# Patient Record
Sex: FEMALE | Race: WHITE | NOT HISPANIC OR LATINO | Employment: OTHER | URBAN - NONMETROPOLITAN AREA
[De-identification: names, ages, dates, MRNs, and addresses within clinical notes are randomized per-mention and may not be internally consistent; named-entity substitution may affect disease eponyms.]

---

## 2017-07-27 ENCOUNTER — AMBULATORY - GICH (OUTPATIENT)
Dept: RADIOLOGY | Facility: OTHER | Age: 67
End: 2017-07-27

## 2017-07-27 ENCOUNTER — HOSPITAL ENCOUNTER (OUTPATIENT)
Dept: RADIOLOGY | Facility: OTHER | Age: 67
End: 2017-07-27

## 2017-07-27 ENCOUNTER — AMBULATORY - GICH (OUTPATIENT)
Dept: LAB | Facility: OTHER | Age: 67
End: 2017-07-27

## 2017-07-27 DIAGNOSIS — G44.89 OTHER HEADACHE SYNDROME: ICD-10-CM

## 2017-07-27 DIAGNOSIS — I10 ESSENTIAL (PRIMARY) HYPERTENSION: ICD-10-CM

## 2017-07-27 DIAGNOSIS — R42 DIZZINESS AND GIDDINESS: ICD-10-CM

## 2017-07-27 DIAGNOSIS — H53.452 OTHER LOCALIZED VISUAL FIELD DEFECT, LEFT EYE: ICD-10-CM

## 2017-07-27 LAB
CREAT SERPL-MCNC: 1.13 MG/DL (ref 0.7–1.3)
GFR IF NOT AFRICAN AMERICAN - HISTORICAL: 48 ML/MIN/1.73M2

## 2017-07-28 ENCOUNTER — AMBULATORY - GICH (OUTPATIENT)
Dept: RADIOLOGY | Facility: OTHER | Age: 67
End: 2017-07-28

## 2017-07-28 ENCOUNTER — HOSPITAL ENCOUNTER (OUTPATIENT)
Dept: RADIOLOGY | Facility: OTHER | Age: 67
End: 2017-07-28

## 2017-07-28 DIAGNOSIS — I63.431 CEREBRAL INFARCTION DUE TO EMBOLISM OF RIGHT POSTERIOR CEREBRAL ARTERY (H): ICD-10-CM

## 2017-08-10 ENCOUNTER — AMBULATORY - GICH (OUTPATIENT)
Dept: PHYSICAL THERAPY | Facility: OTHER | Age: 67
End: 2017-08-10

## 2017-08-10 DIAGNOSIS — I63.9 CEREBRAL INFARCTION (H): ICD-10-CM

## 2017-08-10 DIAGNOSIS — H53.40 VISUAL FIELD DEFECTS: ICD-10-CM

## 2017-08-16 ENCOUNTER — HOSPITAL ENCOUNTER (OUTPATIENT)
Dept: PHYSICAL THERAPY | Facility: OTHER | Age: 67
Setting detail: THERAPIES SERIES
End: 2017-08-16

## 2017-08-16 DIAGNOSIS — I63.9 CEREBRAL INFARCTION (H): ICD-10-CM

## 2017-08-16 DIAGNOSIS — H53.40 VISUAL FIELD DEFECTS: ICD-10-CM

## 2017-08-18 ENCOUNTER — HOSPITAL ENCOUNTER (OUTPATIENT)
Dept: PHYSICAL THERAPY | Facility: OTHER | Age: 67
Setting detail: THERAPIES SERIES
End: 2017-08-18

## 2017-08-23 ENCOUNTER — HOSPITAL ENCOUNTER (OUTPATIENT)
Dept: PHYSICAL THERAPY | Facility: OTHER | Age: 67
Setting detail: THERAPIES SERIES
End: 2017-08-23

## 2017-08-29 ENCOUNTER — HOSPITAL ENCOUNTER (OUTPATIENT)
Dept: PHYSICAL THERAPY | Facility: OTHER | Age: 67
Setting detail: THERAPIES SERIES
End: 2017-08-29

## 2017-08-31 ENCOUNTER — HOSPITAL ENCOUNTER (OUTPATIENT)
Dept: PHYSICAL THERAPY | Facility: OTHER | Age: 67
Setting detail: THERAPIES SERIES
End: 2017-08-31

## 2017-09-05 ENCOUNTER — HOSPITAL ENCOUNTER (OUTPATIENT)
Dept: PHYSICAL THERAPY | Facility: OTHER | Age: 67
Setting detail: THERAPIES SERIES
End: 2017-09-05

## 2017-09-07 ENCOUNTER — HOSPITAL ENCOUNTER (OUTPATIENT)
Dept: PHYSICAL THERAPY | Facility: OTHER | Age: 67
Setting detail: THERAPIES SERIES
End: 2017-09-07

## 2017-09-12 ENCOUNTER — HOSPITAL ENCOUNTER (OUTPATIENT)
Dept: PHYSICAL THERAPY | Facility: OTHER | Age: 67
Setting detail: THERAPIES SERIES
End: 2017-09-12

## 2017-09-14 ENCOUNTER — HOSPITAL ENCOUNTER (OUTPATIENT)
Dept: PHYSICAL THERAPY | Facility: OTHER | Age: 67
Setting detail: THERAPIES SERIES
End: 2017-09-14

## 2017-09-19 ENCOUNTER — HOSPITAL ENCOUNTER (OUTPATIENT)
Dept: PHYSICAL THERAPY | Facility: OTHER | Age: 67
Setting detail: THERAPIES SERIES
End: 2017-09-19

## 2017-09-21 ENCOUNTER — HOSPITAL ENCOUNTER (OUTPATIENT)
Dept: PHYSICAL THERAPY | Facility: OTHER | Age: 67
Setting detail: THERAPIES SERIES
End: 2017-09-21

## 2017-09-26 ENCOUNTER — HOSPITAL ENCOUNTER (OUTPATIENT)
Dept: PHYSICAL THERAPY | Facility: OTHER | Age: 67
Setting detail: THERAPIES SERIES
End: 2017-09-26

## 2017-09-28 ENCOUNTER — HOSPITAL ENCOUNTER (OUTPATIENT)
Dept: PHYSICAL THERAPY | Facility: OTHER | Age: 67
Setting detail: THERAPIES SERIES
End: 2017-09-28

## 2017-10-03 ENCOUNTER — HOSPITAL ENCOUNTER (OUTPATIENT)
Dept: PHYSICAL THERAPY | Facility: OTHER | Age: 67
Setting detail: THERAPIES SERIES
End: 2017-10-03

## 2017-10-05 ENCOUNTER — HOSPITAL ENCOUNTER (OUTPATIENT)
Dept: PHYSICAL THERAPY | Facility: OTHER | Age: 67
Setting detail: THERAPIES SERIES
End: 2017-10-05

## 2017-10-12 ENCOUNTER — HOSPITAL ENCOUNTER (OUTPATIENT)
Dept: PHYSICAL THERAPY | Facility: OTHER | Age: 67
Setting detail: THERAPIES SERIES
End: 2017-10-12

## 2017-10-18 ENCOUNTER — HOSPITAL ENCOUNTER (OUTPATIENT)
Dept: PHYSICAL THERAPY | Facility: OTHER | Age: 67
Setting detail: THERAPIES SERIES
End: 2017-10-18

## 2017-10-25 ENCOUNTER — HOSPITAL ENCOUNTER (OUTPATIENT)
Dept: PHYSICAL THERAPY | Facility: OTHER | Age: 67
Setting detail: THERAPIES SERIES
End: 2017-10-25

## 2017-10-31 ENCOUNTER — HOSPITAL ENCOUNTER (OUTPATIENT)
Dept: PHYSICAL THERAPY | Facility: OTHER | Age: 67
Setting detail: THERAPIES SERIES
End: 2017-10-31

## 2017-11-07 ENCOUNTER — HOSPITAL ENCOUNTER (OUTPATIENT)
Dept: PHYSICAL THERAPY | Facility: OTHER | Age: 67
Setting detail: THERAPIES SERIES
End: 2017-11-07

## 2017-12-27 NOTE — PROGRESS NOTES
Patient Information     Patient Name MRN Blessing Membreno [id number] Female 1950      Progress Notes by Jacque Recinos OT at 2017  2:38 PM     Author:  Jacque Recinos OT  Service:  (none) Author Type:  OT- Occupational Therapist     Filed:  2017  3:50 PM  Date of Service:  2017  2:38 PM Status:  Addendum     :  Jacque Recinos OT (OT- Occupational Therapist)        Related Notes: Original Note by Jacque Recinos OT (OT- Occupational Therapist) filed at 2017  3:45 PM            [facility]  Outpatient OT - Daily Note          Date of Service:                   2017                                        Visit #:  2     Patient Name:  Blessing Floyd  Referring MD/Provider:  Karen Marmolejo.   Diagnosis: CVA with Visual field Deficit  Treatment Diagnosis:  Left visional field deficit  Insurance: Medicare  Onset date: 2017  Start of Care Date:    2017   Certification Dates: From:              2017                                                    Re-Cert Due: 10/13/2017  Subjective    Reports she had eye fatigue and a little headache after last session. '    Pain Rating:    3-4 at rest for eye fatigue increased to 5-6/10  / Location:  Eye fatigue resolves with rest.       G codes and Modifier based on MFVPT-3  and clinical judgement:     Current Primary G Code and Modifier:    Per the Patient's intake and/or assessment the Primary G Code is: Other PT/OT Primary .   The Patient's Impairment, Limitation or Restriction Modifier would be best described as: CI - 1% - 20% Impairment.     Goal Primary G Code and Modifier:    The Patient's G Code Goal would be: Other PT/OT Primary    The Patient's Impairment, Limitation or Restriction Modifier goal would be best described as: CI - 1% - 20% Impairment.   Discharge Primary G Code and Modifier:      The Patient's status upon Discharge is Other PT/OT Primary    The  Patient's Impairment, Limitation or Restriction Modifier would be best described as CI - 1% - 20% Impairment.         Objective    Today's Intervention:    Patient participated in the Motor Free Visual Perception Test- Revised (MVPT-3). The (MVPT-3) is an individually administered test designed to assess overall visual perceptual ability in individuals ages 4 years 0 months through 95 years old.      Perceptual tasks include spatial relationships, visual discrimination, figure ground, visual closure, and visual memory. Performance in these areas provides a single score that represents the individual s general visual perceptual ability. Patient scored above average for her/his age on this portion of the testing. His/her percentile rank was  94%ile %, with a raw score of  58   out of 65.     A supplement to the MVPT-3 which was utilized during patient s testing is the recording of response time information. Response time appears to be an indication of the integrity of the central nervous system and is relevant when determining developmental delays or when tracking a person s progress in rehabilitation programs (such as after stroke or head/spinal cord injury). An increasingly common use of the MVPT-3 cited by occupational therapists and other rehabilitation professionals is the recertification of persons for driving, which is often an emotionally sensitive area, given that one s ability to drive is often equated with their sense of independence, self-sufficiency, and worth. The act of driving involves the coordination of a number of complex visual perceptual skills. Fitness to drive involves both the accurate perception of objects in the   environment and the speed at which that perception takes place- a  perceptual time  so to speak. Added to this is a physical,  motoric,  reaction time in which a behavioral response is initiated. Thus, it is imperative not only that someone can quickly and accurately identify a  shape as being that of a stop sign (or bus, or child on a bicycle), but also that they can then react motorically in time to stop the car safely and/or avoid a collision.     With a response time of 2.6 seconds, patient scored at the  25-50% percentile on the response time index, placing him/her in the   average category.      Mode A: 120 seconds running time.    Trial #1 score: 85  average time: 1.41 ;    Trial #2 score: 115 average time: 1.04 ;  Reported eye fatigue after 45 secs.      Mode B: 60 seconds running time. Light flashes for 1 second:  > 42= safe  35-41= borderline    Not turning head  -Trial #1 Score 41/67  average time .83    Turning head    -Trial #2: score 19/62 average times 1.01 T       Mode B :  60 seconds running time: light stays on for 5 secs.   Turning head - Trial #1 Score 39/39  average time 1:53         Home Exercise Program: 8/18/2017 letter and number cancellations, hidden words.     Assessment    Therapist Assessment/Response to Intervention:  Blessing has trained herself t not move her head while looking for objects. when she does turn her head, she has difficulty locating the light and striking it. Her scores on the MFVPT-4 are with in normal limites for her peer group.        Goals:  Functional short term goals (established in collaboration with the patient, to be met in 8 weeks):  1. Patient will report the ability to perform meal prep with no to mild difficulty/ 8-10/ 10 in PSFP  2. Patient will report the ability to perform house hold cleaning with no/mild difficulty/8-10/10 on PSFP  3. Patient will  Meet safe operation of motor vehicle guidelines on the Dynavision for 2 weeks in a row.         Long term goal (to be met in 12 weeks):  A. Patient will pass all areas of the community mobility assessment to determine the ability to safely operate a motor vehicle.      B. Patient will have adequate vision to resume safe woodcarving activities.   Plan  Plan:  Progress dynavison exercises,  initiate pursuits with flashlight,     Student has been instructed in and demonstrates skills necessary to carry out above stated treatment plan: Yes    Thank you for your referral to [facility].  Please call with any questions, concerns or comments.  [phone]    Jacque Recinos OTR/L  Occupational Therapist

## 2017-12-27 NOTE — PROGRESS NOTES
"Patient Information     Patient Name MRN Blessing Membreno [id number] Female 1950      Progress Notes by Jacque Recinos OT at 2017 10:04 AM     Author:  Jacque Recinos OT Service:  (none) Author Type:  OT- Occupational Therapist     Filed:  2017 11:04 AM Date of Service:  2017 10:04 AM Status:  Signed     :  Jacque Recinos OT (OT- Occupational Therapist)            [facility]  Outpatient OT - Daily Note          Date of Service:                  2017                            Visit #:  7     Patient Name:  Blessing Floyd  Referring MD/Provider:  Karen Marmolejo.   Diagnosis: CVA with Visual field Deficit  Treatment Diagnosis:  Left visional field deficit  Insurance: Medicare  Onset date: 2017  Start of Care Date:    2017   Certification Dates: From:              2017                                                    Re-Cert Due: 10/13/2017  Subjective    \"I think I over did it yesterday.\"  She drove into town yesterday and went to the [employer], shopping, and out to eat.  She had a headache and sore muscles that night.       Pain Rating:    3-4 at rest for eye fatigue increased to 5-6/10  / Location:  Eye fatigue resolves with rest.   Objective    Today's Intervention:    Scanning:   Column jumps holding paper in hand 10 rows: Right eye, left eye and both.  Reported some eye fatigue.                Mode A: 120 seconds running time. Light stays on until button is hit.      - Trial #1 score: 99 average time: 1.21     -Trial #2 score: 93 average time: 1.29        Prolong discussion on divided attention and visual inattention and how to work on these at home. Handouts provided.     Home Exercise Program: 2017 letter and number cancellations, hidden words. 2017 picture find, symmetrical drawing, stroop activities.     Assessment    Therapist Assessment/Response to Intervention:    Blessing is able to focus and hit lights with cues. She " stops talking and is more alert to seeing the lights. She self reports difficulty attending to tasks.    Goals:  Functional short term goals (established in collaboration with the patient, to be met in 8 weeks):  1. Patient will report the ability to perform meal prep with no to mild difficulty/ 8-10/ 10 in PSFP  2. Patient will report the ability to perform house hold cleaning with no/mild difficulty/8-10/10 on PSFP  3. Patient will  Meet safe operation of motor vehicle guidelines on the Dynavision for 2 weeks in a row.         Long term goal (to be met in 12 weeks):  A. Patient will pass all areas of the community mobility assessment to determine the ability to safely operate a motor vehicle.      B. Patient will have adequate vision to resume safe woodcarving activities.   Plan  Plan:  Progress dynavison exercises and divided attention task.      Student has been instructed in and demonstrates skills necessary to carry out above stated treatment plan: Yes    Thank you [facility] your [facility] to [facility].  Please call with any questions, concerns or comments.  [phone]    Jacque Recinos OTR/L  Occupational Therapist

## 2017-12-27 NOTE — PROGRESS NOTES
Patient Information     Patient Name MRN Blessing Membreno [id number] Female 1950      Progress Notes by Jacque Recinos OT at 2017 10:35 AM     Author:  Jacque Recinos OT Service:  (none) Author Type:  OT- Occupational Therapist     Filed:  2017  3:17 PM Date of Service:  2017 10:35 AM Status:  Signed     :  Jacque Recinos OT (OT- Occupational Therapist)            [facility]  Outpatient OT - Daily Note          Date of Service:                  2017                            Visit #:  9     Patient Name:  Blessing Floyd  Referring MD/Provider:  Karen Marmolejo.   Diagnosis: CVA with Visual field Deficit  Treatment Diagnosis:  Left visional field deficit  Insurance: Medicare  Onset date: 2017  Start of Care Date:    2017   Certification Dates: From:              2017                                                    Re-Cert Due: 10/13/2017  Subjective    Blessing reports [place]. She had one episode where she thought she though she did not see something. she did not panic and was able to   Continue driving.     Pain Rating:    3-4 at rest for eye fatigue increased to 5-6/10  / Location:  Eye fatigue resolves with rest.   Objective    Today's Intervention:       Brain matrix dice activities that focus on divided attention with 1-3 steps. Her reaction time slowed but minimal errors. She was able to self correct errors as she performed them. tolerated       Mode B: 2 min.  running time. Light stays on until button is hit.    Trial #1 score: 95/95 average time: 1.26 keep looking in lower quadrant ;     3 min:  score: 147/147 average time: 1.22  Reports it helps to look to have a focal point to start scannin  4 min: score: 191/191 average reaction time: 1.25      Home Exercise Program: 2017 letter and number cancellations, hidden words. 2017 picture find, symmetrical drawing,  stroop activities. 9/12/2017 using cards     Assessment    Therapist Assessment/Response to Intervention:   Blessing is able to attend to tasks longer with increase accuracy and decrease in symptoms.   Goals:  Functional short term goals (established in collaboration with the patient, to be met in 8 weeks):  1. Patient will report the ability to perform meal prep with no to mild difficulty/ 8-10/ 10 in PSFP  2. Patient will report the ability to perform house hold cleaning with no/mild difficulty/8-10/10 on PSFP  3. Patient will  Meet safe operation of motor vehicle guidelines on the Dynavision for 2 weeks in a row.         Long term goal (to be met in 12 weeks):  A. Patient will pass all areas of the community mobility assessment to determine the ability to safely operate a motor vehicle.      B. Patient will have adequate vision to resume safe woodcarving activities.   Plan  Plan:  Progress dynavison exercises and divided attention task.      Student has been instructed in and demonstrates skills necessary to carry out above stated treatment plan: Yes    Thank you for your referral to [facility].  Please call with any questions, concerns or comments.  [phone]    Jacque Recinos OTR/L  Occupational Therapist

## 2017-12-27 NOTE — PROGRESS NOTES
"Patient Information     Patient Name MRN Blessing Membreno [id number] Female 1950      Progress Notes by Jacque Recinos OT at 2017 10:38 AM     Author:  Jacque Recinos OT Service:  (none) Author Type:  OT- Occupational Therapist     Filed:  2017 12:51 PM Date of Service:  2017 10:38 AM Status:  Signed     :  Jacque Recinos OT (OT- Occupational Therapist)            [facility]  Outpatient OT - Daily Note          Date of Service:                  2017                            Visit #:  4     Patient Name:  Blessing Floyd  Referring MD/Provider:  Karen Marmolejo.   Diagnosis: CVA with Visual field Deficit  Treatment Diagnosis:  Left visional field deficit  Insurance: Medicare  Onset date: 2017  Start of Care Date:    2017   Certification Dates: From:              2017                                                    Re-Cert Due: 10/13/2017  Subjective    \"Can my eyesight be getting worse?\" reports her vision is \"darker\". She also reports getting \"holes\" I her vision when her eyes are fatigues.   Blessing consistently reports difficulty seeing in the middle left visual field. she also has reports of objects fabding in and out.       Pain Rating:    3-4 at rest for eye fatigue increased to 5-6/10  / Location:  Eye fatigue resolves with rest.   Objective    Today's Intervention:    Scanning:   Column jumps standing 5' from wall all across wall. Outside to inside: Right eye, left eye and both.           Inside to outside: right eye, left eye, both. Reports areas of not seeing that come and go.     Pursuits:    -follow flashlight up and down cupboards and across. No symptoms or concerns.    -completed dot to dot. No problems.     Mode A: 120 seconds running time.    Trial #1 score: 85  average time: 1.41 ;    Trial #2 score: 115 average time: 1.04 ;  Reported eye fatigue after 45 secs.      Mode B: 60 seconds running time. Light " flashes for 5 second, umberto follows with eyes only:    Not turning head -Left eye: reports she does not see lights in middle left quadrant      -right eye: same blind spot.      -both eyes: same blind spots.     Mode B :  60 seconds running time: light stays on for 5 secs.   Turning head - Trial #1 Score 39/39  average time 1:53         Home Exercise Program: 8/18/2017 letter and number cancellations, hidden words.     Assessment    Therapist Assessment/Response to Intervention:    No visual field difficulties noticed during activities.     Goals:  Functional short term goals (established in collaboration with the patient, to be met in 8 weeks):  1. Patient will report the ability to perform meal prep with no to mild difficulty/ 8-10/ 10 in PSFP  2. Patient will report the ability to perform house hold cleaning with no/mild difficulty/8-10/10 on PSFP  3. Patient will  Meet safe operation of motor vehicle guidelines on the Dynavision for 2 weeks in a row.         Long term goal (to be met in 12 weeks):  A. Patient will pass all areas of the community mobility assessment to determine the ability to safely operate a motor vehicle.      B. Patient will have adequate vision to resume safe woodcarving activities.   Plan  Plan:  Progress dynavison exercises, initiate pursuits with flashlight,     Student has been instructed in and demonstrates skills necessary to carry out above stated treatment plan: Yes    Thank you for your [facility] to [facility].  Please call with any questions, concerns or comments.  [phone]    Jacque Recinos OTR/L  Occupational Therapist

## 2017-12-27 NOTE — PROGRESS NOTES
"Patient Information     Patient Name MRN Blessing Membreno [id number] Female 1950      Progress Notes by Jacque Recinos OT at 2017  9:05 AM     Author:  Jacque Recinos OT Service:  (none) Author Type:  OT- Occupational Therapist     Filed:  2017 10:48 AM Date of Service:  2017  9:05 AM Status:  Signed     :  Jacque Recinos OT (OT- Occupational Therapist)            [facility]  Outpatient OT - progress Note        Date of Service:                  2017                            Visit #:  11      Patient Name:  Blessing Floyd  Referring MD/Provider:  Karen Marmolejo.   Diagnosis: CVA with Visual field Deficit  Treatment Diagnosis:  Left visional field deficit  Insurance: Medicare  Onset date: 2017  Start of Care Date:    2017   Certification Dates: From:              2017                                                    Re-Cert Due: 10/13/2017  Opthalmologist: Nomi.       Maria Antonia Funk states she had Reiki healing done on Tuesday ans since then things have calmed down. She has not had abnormal visual sightings since Tuesday. She was able to get all the seeds out of her dayana whiel making humus. She went to [email or web address] on Tuesday and was able to carve the eyes. She brought in her eye doctor reports describing left hemianopsia. She reports her symptoms are 80% resolved. she has decrease her HEP work for fear of reoccurance of her \"visual hysteria\" she has been reading with no eye fatigue or strain.      Pain Rating:    3-4 at rest for eye fatigue increased to 5-6/10  / Location:  Eye fatigue resolves with rest.     Objective    Today's Intervention:         Mode : 2 min seconds running time. Light stays on for 1.5 secs.     - Trial #1 score: 87/102 average time: 102 ; misses in all 4 quadrants.    -Trial #2 score: 99/103 average time: 1.12  Light on for 2.0 seconds. errros in 3/4 quadrants       Prolong discussion on " vision report and compensation of visual field deficients.     Home Exercise Program: 8/18/2017 letter and number cancellations, hidden words. 9/7/2017 picture find, symmetrical drawing, stroop activities. 9/12/2017 using cards     Assessment    Therapist Assessment/Response to Intervention:    Blessing is more calm today and has not experienced any visual hystreria symptoms since the weekend. She feels as if her vision is at 80% of PLOF.     Goals:  Functional short term goals (established in collaboration with the patient, to be met in 8 weeks):  1. Patient will report the ability to perform meal prep with no to mild difficulty/ 8-10/ 10 in PSFP 9/19/2017 this has increase form 3/10 to 4/10   2. Patient will report the ability to perform house hold cleaning with no/mild difficulty/8-10/10 on PSFP 9/19/2017 is currently reporting 4/10  3. Patient will  Meet safe operation of motor vehicle guidelines on the Dynavision for 2 weeks in a row.  9/19/2017 Blessing is inconsistent though has met the safe score in all areas at least once.         Long term goal (to be met in 12 weeks):  A. Patient will pass all areas of the community mobility assessment to determine the ability to safely operate a motor vehicle. 9/19/2017 patient has driven some though is not feeling confident.      B. Patient will have adequate vision to resume safe woodcarving activities. 9/19/2017 has not attempted.       Plan  Plan:  Progress dynavison exercises and divided attention task.      Student has been instructed in and demonstrates skills necessary to carry out above stated treatment plan: Yes    Thank you for your referral to [facility].  Please call with any questions, concerns or comments.  [phone]    Jacque Recinos OTR/L  Occupational Therapist

## 2017-12-27 NOTE — PROGRESS NOTES
Patient Information     Patient Name MRN Blessing Membreno [id number] Female 1950      Progress Notes by Jacque Recinos OT at 2017  9:49 AM     Author:  Jacque Recinos OT Service:  (none) Author Type:  OT- Occupational Therapist     Filed:  2017  9:49 AM Date of Service:  2017  9:49 AM Status:  Signed     :  Jacque Recinos OT (OT- Occupational Therapist)            [facility]  Outpatient OT - Daily Note          Date of Service:                   2017                                        Visit #:  2     Patient Name:  Blessing Floyd  Referring MD/Provider:  Karen Marmolejo.   Diagnosis: CVA with Visual field Deficit  Treatment Diagnosis:  Left visional field deficit  Insurance: Medicare  Onset date: 2017  Start of Care Date:    2017   Certification Dates: From:              2017                                                    Re-Cert Due: 10/13/2017  Subjective    Reports she had eye fatigue and a little headache after last session. '    Pain Rating:    3-4 at rest for eye fatigue increased to 5-6/10  / Location:  Eye fatigue resolves with rest.       G codes and Modifier based on MFVPT-3  and clinical judgement:     Current Primary G Code and Modifier:    Per the Patient's intake and/or assessment the Primary G Code is: Other PT/OT Primary .   The Patient's Impairment, Limitation or Restriction Modifier would be best described as: CI - 1% - 20% Impairment.     Goal Primary G Code and Modifier:    The Patient's G Code Goal would be: Other PT/OT Primary    The Patient's Impairment, Limitation or Restriction Modifier goal would be best described as: CI - 1% - 20% Impairment.   Discharge Primary G Code and Modifier:      The Patient's status upon Discharge is Other PT/OT Primary    The Patient's Impairment, Limitation or Restriction Modifier would be best described as CI - 1% - 20% Impairment.          Objective    Today's Intervention:    Patient participated in the Motor Free Visual Perception Test- Revised (MVPT-3). The (MVPT-3) is an individually administered test designed to assess overall visual perceptual ability in individuals ages 4 years 0 months through 95 years old.      Perceptual tasks include spatial relationships, visual discrimination, figure ground, visual closure, and visual memory. Performance in these areas provides a single score that represents the individual s general visual perceptual ability. Patient scored above average for her/his age on this portion of the testing. His/her percentile rank was  94%ile %, with a raw score of  58   out of 65.     A supplement to the MVPT-3 which was utilized during patient s testing is the recording of response time information. Response time appears to be an indication of the integrity of the central nervous system and is relevant when determining developmental delays or when tracking a person s progress in rehabilitation programs (such as after stroke or head/spinal cord injury). An increasingly common use of the MVPT-3 cited by occupational therapists and other rehabilitation professionals is the recertification of persons for driving, which is often an emotionally sensitive area, given that one s ability to drive is often equated with their sense of independence, self-sufficiency, and worth. The act of driving involves the coordination of a number of complex visual perceptual skills. Fitness to drive involves both the accurate perception of objects in the   environment and the speed at which that perception takes place- a  perceptual time  so to speak. Added to this is a physical,  motoric,  reaction time in which a behavioral response is initiated. Thus, it is imperative not only that someone can quickly and accurately identify a shape as being that of a stop sign (or bus, or child on a bicycle), but also that they can then react motorically  in time to stop the car safely and/or avoid a collision.     With a response time of 2.6 seconds, patient scored at the  25-50% percentile on the response time index, placing him/her in the   average category.      Mode A: 120 seconds running time.    Trial #1 score: 85  average time: 1.41 ;    Trial #2 score: 115 average time: 1.04 ;  Reported eye fatigue after 45 secs.      Mode B: 60 seconds running time. Light flashes for 1 second:  > 42= safe  35-41= borderline    Not turning head  -Trial #1 Score 41/67  average time .83    Turning head    -Trial #2: score 19/62 average times 1.01 T       Mode B :  60 seconds running time: light stays on for 5 secs.   Turning head - Trial #1 Score 39/39  average time 1:53         Home Exercise Program: 8/18/2017 letter and number cancellations, hidden words.     Assessment    Therapist Assessment/Response to Intervention:  Blessing has trained herself t not move her head while looking for objects. when she does turn her head, she has difficulty locating the light and striking it. Her scores on the MFVPT-4 are with in normal limites for her peer group.        Goals:  Functional short term goals (established in collaboration with the patient, to be met in 8 weeks):  1. Patient will report the ability to perform meal prep with no to mild difficulty/ 8-10/ 10 in PSFP  2. Patient will report the ability to perform house hold cleaning with no/mild difficulty/8-10/10 on PSFP  3. Patient will  Meet safe operation of motor vehicle guidelines on the Dynavision for 2 weeks in a row.         Long term goal (to be met in 12 weeks):  A. Patient will pass all areas of the community mobility assessment to determine the ability to safely operate a motor vehicle.      B. Patient will have adequate vision to resume safe woodcarving activities.   Plan  Plan:  Progress dynavison exercises, initiate pursuits with flashlight,     Student has been instructed in and demonstrates skills necessary to carry  out above stated treatment plan: Yes    Thank you for your referral to [facility].  Please call with any questions, concerns or comments.  [phone]    Jacque Recinos OTR/L  Occupational Therapist

## 2017-12-27 NOTE — PROGRESS NOTES
Patient Information     Patient Name MRN Blessing Membreno [id number] Female 1950      Progress Notes by Mary Mcgill at 2017  1:20 PM     Author:  Mary Mcgill Service:  (none) Author Type:  Other Clinical Staff     Filed:  2017  1:20 PM Date of Service:  2017  1:20 PM Status:  Signed     :  Mary Mcgill (Other Clinical Staff)            Falls Risk Criteria:    Age 65 and older or under age 4        Sensory deficits    Poor vision    Use of ambulatory aides    Impaired judgment    Unable to walk independently    Meets High Risk criteria for falls:  Yes               1.  Do you have dizziness or vertigo?    no                    2.  Do you need help standing or walking?   no                 3.  Have you fallen within the last 6 months?    no           4.  Has the patient been fasting?      No         If any risks are marked Yes, the following interventions are utilized:    Do not leave patient unattended     Assist patient in the dressing room and bathroom    Have ambulatory aides available throughout procedure    Involve patient s family if available

## 2017-12-28 NOTE — PROGRESS NOTES
Patient Information     Patient Name MRN Blessing Membreno [id number] Female 1950      Progress Notes by Jacque Recinos OT at 10/18/2017  9:34 AM     Author:  Jacque Recinos OT Service:  (none) Author Type:  OT- Occupational Therapist     Filed:  10/18/2017 10:25 AM Date of Service:  10/18/2017  9:34 AM Status:  Signed     :  Jacque Recinos OT (OT- Occupational Therapist)            [facility]  Outpatient OT -  Recertification:         Date of Service:                   10/12/2017                           Visit #:  15     Patient Name:  Blessing Floyd  Referring MD/Provider:  Karen Marmolejo.   Diagnosis: CVA with Visual field Deficit  Treatment Diagnosis:  Left visional field deficit  Insurance: Medicare  Onset date: 2017  Start of Care Date:    2017   Current Certification Dates: From:                10/12/2017                                                   Re-Cert Due:  Previous Certification dates: 2017- 10/13/2017  Opthalmologist: Nomi.   F/U:  Karen,   Neurologist: .   Eye Appointment:     Subjective    Blessing reports being very low and frustrated. She has not been sleeping due to back pian. Her vision is variable related to fatigue. She feels very tired all the time.       Pain Rating:      / Location:  Eye fatigue resolves with rest.    10/13/2017  Objective    Today's Intervention:       prolong education on her vision, the affects of a TIA/CVA on a person.         Mode A: 60 seconds running time. Light stays on until button is hit.     - Trial #1 score: 51 average time: 1.18     Mod B: light stays on for 2 seconds   2 min . -Trial #1 score: 104/108  average time: 1.07         Divided attention with 3 digit number flash for 1 sec.   2 min  -trial 1: score:  89/97  average reaction time: 1.13 missed digit 0  2 min  -trial 2 score: 96/102  average reaction time:  1.12 missed digit 0            Home Exercise Program: 8/18/2017 letter and number cancellations, hidden words. 9/7/2017 picture find, symmetrical drawing, stroop activities. 9/12/2017 using cards 10/5/2017 Educated on increase lighting in her house by changing fro energy saving lights.      Assessment    Therapist Assessment/Response to Intervention: Blessing is at a low point in dealing with her health. She is frustrated with the lack of answers to her vision, her back pain, and lack of response to her medication concerns.         Goals:  Functional short term goals (established in collaboration with the patient, to be met in 8 weeks):  1. Patient will report the ability to perform meal prep with no to mild difficulty/ 8-10/ 10 in PSFP 9/19/2017 this has increase form 3/10 to 4/10   2. Patient will report the ability to perform house hold cleaning with no/mild difficulty/8-10/10 on PSFP 9/19/2017 is currently reporting 4/10  3. Patient will  Meet safe operation of motor vehicle guidelines on the Dynavision for 2 weeks in a row.  9/19/2017 Blessing is inconsistent though has met the safe score in all areas at least once.         Long term goal (to be met in 12 weeks):  A. Patient will pass all areas of the community mobility assessment to determine the ability to safely operate a motor vehicle. 9/19/2017 patient has driven some though is not feeling confident.      B. Patient will have adequate vision to resume safe woodcarving activities. 9/19/2017 has not attempted. 10/18/2017 started working on her carvings on 10/3. Is still working on them and has finished the eyes and cheeks.      Plan  Plan:  Progress dynavison exercises and divided attention task.      Student has been instructed in and demonstrates skills necessary to carry out above stated treatment plan: Yes    Thank you for your referral to [facility].  Please call with any questions, concerns or comments.  [phone]    Jacque Recinos OTR/L  Occupational  Therapist

## 2017-12-28 NOTE — PROGRESS NOTES
"Patient Information     Patient Name MRN Blessing Membreno [id number] Female 1950      Progress Notes by Jacque Recinos OT at 10/31/2017  9:47 AM     Author:  Jacque Recinos OT Service:  (none) Author Type:  OT- Occupational Therapist     Filed:  10/31/2017 10:24 AM Date of Service:  10/31/2017  9:47 AM Status:  Signed     :  Jacque Recinos OT (OT- Occupational Therapist)            [facility]  Outpatient OT -  Recertification:         Date of Service:                   10/31/2017                           Visit #:  17  Patient Name:  Blessing Floyd  Referring MD/Provider:  Karen Marmolejo.   Diagnosis: CVA with Visual field Deficit  Treatment Diagnosis:  Left visional field deficit  Insurance: Medicare  Onset date: 2017  Start of Care Date:    2017   Current Certification Dates: From:                10/12/2017                                                   Re-Cert Due:  Previous Certification dates: 2017- 10/13/2017  Opthalmologist: Nomi.   F/U:  Karen,   Neurologist: .   Eye Appointment:     Subjective    \"I am task oriented today\" Blessing was making a list of questions to ask the neurologist tomorrow. Reports most of her headaches are above the right eye in the sinus area. No sinus infections since being on her C-pap.she was able to do 6 work sheet of eye exercises in 20 minutes with disruptions and no symptoms.     Pain Rating:  Still ranges from 1-8/10    / Location:  Eye fatigue resolves with rest.     Objective    Today's Intervention:    Column jumps across wall:    -right eye: 38 seconds   -left eye: 41seconds   -both. 36 seconds    * no headache.           Mode A: 60 seconds running time. Light stays on for 3 seconds patient follows lights with eyes only   -6 feet away and 3 feet away no symptoms          Mode B: time 60 seconds  Light flashes for 2 second: patient extinguishes light by " pressing them      Trial #1 Score 58/59  average time .99        Trial #2 Score 61/61  average time .97     Home Exercise Program: 8/18/2017 letter and number cancellations, hidden words. 9/7/2017 picture find, symmetrical drawing, stroop activities. 9/12/2017 using cards 10/5/2017 Educated on increase lighting in her house by changing from energy saving lights.    Assessment    Therapist Assessment/Response to Intervention: Blessing was able to perform column jumps wit no symptoms today. seh sees the neurologist tomorrow.       Goals:  Functional short term goals (established in collaboration with the patient, to be met in 8 weeks):  1. Patient will report the ability to perform meal prep with no to mild difficulty/ 8-10/ 10 in PSFP 9/19/2017 this has increase form 3/10 to 4/10     2. Patient will report the ability to perform house hold cleaning with no/mild difficulty/8-10/10 on PSFP 9/19/2017 is currently reporting 4/10  3. Patient will  Meet safe operation of motor vehicle guidelines on the Dynavision for 2 weeks in a row.  9/19/2017 Blessing is inconsistent though has met the safe score in all areas at least once. 10/31/2017 reports 8/10 for driving funcitoning     Long term goal (to be met in 12 weeks):  A. Patient will pass all areas of the community mobility assessment to determine the ability to safely operate a motor vehicle. 9/19/2017 patient has driven some though is not feeling confident. 10/31/2017 she drives into town and the [facility]      B. Patient will have adequate vision to resume safe woodcarving activities. 9/19/2017 has not attempted. 10/18/2017 started working on her carvings on 10/3. Is still working on them and has finished the eyes and cheeks.      Plan  Plan:  Progress dynavison exercises and divided attention task.      Student has been instructed in and demonstrates skills necessary to carry out above stated treatment plan: Yes    Thank you for your referral to [facility].   Please call with any questions, concerns or comments.  [phone]    Jacque Recinos OTR/L  Occupational Therapist

## 2017-12-28 NOTE — PROGRESS NOTES
Patient Information     Patient Name MRN Sex Blessing Mendez [id number] Female 1950      Progress Notes by Bethany Vega RN at 2017  2:40 PM     Author:  Bethany Vega RN Service:  (none) Author Type:  NURS- Registered Nurse     Filed:  2017  2:41 PM Date of Service:  2017  2:40 PM Status:  Signed     :  Bethany Vega RN (NURS- Registered Nurse)            Patient's MRI completed.  Talkative. BP Right lower arm @ 128/78, P= 75, SaO2=93%. Traveling back to [place] via ambulance.

## 2017-12-28 NOTE — PROGRESS NOTES
Patient Information     Patient Name MRN Blessing Membreno [id number] Female 1950      Progress Notes by Jacque Recinos OT at 10/12/2017 10:22 AM     Author:  Jacque Recinos OT Service:  (none) Author Type:  OT- Occupational Therapist     Filed:  10/18/2017 10:33 AM Date of Service:  10/12/2017 10:22 AM Status:  Signed     :  Jacque Recinos OT (OT- Occupational Therapist)            [facility]  Outpatient OT -  Recertification:         Date of Service:                   10/12/2017                           Visit #:  15     Patient Name:  Blessing Floyd  Referring MD/Provider:  Karen Marmolejo.   Diagnosis: CVA with Visual field Deficit  Treatment Diagnosis:  Left visional field deficit  Insurance: Medicare  Onset date: 2017  Start of Care Date:    2017   Current Certification Dates: From:                10/12/2017                                                   Re-Cert Due:  Previous Certification dates: 2017- 10/13/2017  Opthalmologist: Nomi.   F/U:  Karen,   Neurologist: .   Eye Appointment:     Subjective        Pain Rating:      / Location:  Eye fatigue resolves with rest.    10/13/2017    Patient Specific Functional and Pain Scales (PSFS):   10/12/2017   Clinician Instructions: Complete after the history and before the exam.    Initial Assessment: We want to know what 3 activities in your life you are unable to perform, or are having the most difficulty performing, as a result of your chief problem. Please list and score at least 3 activities that you are unable to perform, or having the most difficulty performing, because of your chief problem.   Patient Specific Activity Scoring Scheme (score one number for each activity):   Activity Score (0-10)  0= Unable to perform activity  10= Able to perform activity at same level as before injury or problem   1. driving 7/10   2. dishes 5/10   3.  cleaning 5/10   4. Meal prep 5/10   Totals:  22/40 = 55 % ability which relates to 45% impairment    Patient verbally states that they understand that the information they have provided above is current and complete to the best of their knowledge.    Patient Specific Functional Scale Modifier Scale Conversion: (patient's modifier that correlates with pt's score on PSFS): 6-CK (40% Impaired).    G codes and Modifier taken from patient completing the PSFS:   Current Primary G Code and Modifier:    Per the Patient's intake and/or assessment the Primary G Code is: Self Care .   The Patient's Impairment, Limitation or Restriction Modifier would be best described as: CK - 40% - 60% Impairment.   Goal Primary G Code and Modifier:    The Patient's G Code Goal would be: Self Care    The Patient's Impairment, Limitation or Restriction Modifier goal would be best described as: CI - 1% - 20% Impairment.        Patient Specific Functional and Pain Scales (PSFS):   9/19/2017                        Clinician Instructions: Complete after the history and before the exam.                         Initial Assessment: We want to know what 3 activities in your life you are unable to perform, or are having the most difficulty performing, as a result of your chief problem. Please list and score at least 3 activities that you are unable to perform, or having the most difficulty performing, because of your chief problem.   Patient Specific Activity Scoring Scheme (score one number for each activity):   Activity Score (0-10)  0= Unable to perform activity  10= Able to perform activity at same level as before injury or problem   1. Driving 6/10   2. Dishes 4/10   3. cleaning 4/10   4. Meal prep 4/10   Totals:  14/40 = 35 % ability which relates to 65 % impairment                         Patient verbally states that they understand that the information they have provided above is current and complete to the best of their knowledge.                          Patient Specific Functional Scal4-CL (60% Impaired)e Modifier Scale Conversion: (patient's modifier that correlates with pt's score on PSFS): .       Objective    Today's Intervention:       Mode A: 60 seconds running time. Light stays on until button is hit.     - Trial #1 score: 51 average time: 1.18     Mod B: light stays on for 2 seconds   2 min . -Trial #1 score: 104/108  average time: 1.07         Divided attention with 3 digit number flash for 1 sec.   2 min  -trial 1: score:  89/97  average reaction time: 1.13 missed digit 0  2 min  -trial 2 score: 96/102  average reaction time:  1.12 missed digit 0           Home Exercise Program: 8/18/2017 letter and number cancellations, hidden words. 9/7/2017 picture find, symmetrical drawing, stroop activities. 9/12/2017 using cards 10/5/2017 Educated on increase lighting in her house by changing fro energy saving lights.      Assessment    Therapist Assessment/Response to Intervention: Blessing is making progress in all areas. She continues to perform well on the [place]. Scoring in the Safe area for operating a motor vehicle. Her visual filed has improved significantly and she is now showing the ability to compensate to make up for any residual deficits.  She is frustrated by lack of answers to her continued symptoms and thinks they may be related to medications she is on. She is anxious about having another event that will affect her vision again. She remains appropriate for skilled OT to  Work on compensation technique for reaming visual deficits.       Goals:  Functional short term goals (established in collaboration with the patient, to be met in 8 weeks):  1. Patient will report the ability to perform meal prep with no to mild difficulty/ 8-10/ 10 in PSFP 9/19/2017 this has increase form 3/10 to 4/10   2. Patient will report the ability to perform house hold cleaning with no/mild difficulty/8-10/10 on PSFP 9/19/2017 is currently reporting  4/10  3. Patient will  Meet safe operation of motor vehicle guidelines on the Dynavision for 2 weeks in a row.  9/19/2017 Blessing is inconsistent though has met the safe score in all areas at least once.         Long term goal (to be met in 12 weeks):  A. Patient will pass all areas of the community mobility assessment to determine the ability to safely operate a motor vehicle. 9/19/2017 patient has driven some though is not feeling confident.      B. Patient will have adequate vision to resume safe woodcarving activities. 9/19/2017 has not attempted.       Plan  Plan:  Progress dynavison exercises and divided attention task.  decreaes frequency to 1x/week       Student has been instructed in and demonstrates skills necessary to carry out above stated treatment plan: Yes    Thank you for your referral to [facility].  Please call with any questions, concerns or comments.  [phone]    Jacque Recinos OTR/L  Occupational Therapist           X____________________________________________________    Physician Signature                     Date  Time    Fern Marmolejo [facility].

## 2017-12-28 NOTE — PROGRESS NOTES
Patient Information     Patient Name MRN Blessing Membreno [id number] Female 1950      Progress Notes by Jacque Recinos OT at 2017 10:17 AM     Author:  Jacque Recinos OT Service:  (none) Author Type:  OT- Occupational Therapist     Filed:  2017 11:07 AM Date of Service:  2017 10:17 AM Status:  Signed     :  Jacque Recinos OT (OT- Occupational Therapist)            [facility]  Outpatient OT - daily Note        Date of Service:                  2017                            Visit #:  13      Patient Name:  Blessing Floyd  Referring MD/Provider:  Karen Marmolejo.   Diagnosis: CVA with Visual field Deficit  Treatment Diagnosis:  Left visional field deficit  Insurance: Medicare  Onset date: 2017  Start of Care Date:    2017   Certification Dates: From:              2017                                                    Re-Cert Due: 10/13/2017  Opthalmologist: Nomi.   F/U:  Karen Em    Blessing was peeling apples ans noticed that she has a more difficult time seeing when the contrast is not sarp. For example, when the apple peel is close to the same color as the core she has difficulty seeing it.      Pain Rating:    3-4 at rest for eye fatigue increased to 5-6/10  / Location:  Eye fatigue resolves with rest.     Objective    Today's Intervention:    Assessments for contrast sensitivity:   -able to fill a clear measuring cup to 1/2 cup with good accuracy   -able to tell how much alcohol is in clear bottle.    - able to read 5 number at 1.25% contrast which is normal    Response to  light stimulation    -pupils PERRL      Home Exercise Program: 2017 letter and number cancellations, hidden words. 2017 picture find, symmetrical drawing, stroop activities. 2017 using cards     Assessment    Therapist Assessment/Response to Intervention:    Contrast sensitivity assessments indicate no concerns for  Blessing. She expresses anxiety about her vision no being at prior level thought is able to function and tests at normal levels. She is aware of what she describes as not normal vision for her and may be compensating well.     Goals:  Functional short term goals (established in collaboration with the patient, to be met in 8 weeks):  1. Patient will report the ability to perform meal prep with no to mild difficulty/ 8-10/ 10 in [facility] 9/19/2017 this has increase form 3/10 to 4/10   2. Patient will report the ability to perform house hold cleaning with no/mild difficulty/8-10/10 on PSFP 9/19/2017 is currently reporting 4/10  3. Patient will  Meet safe operation of motor vehicle guidelines on the Dynavision for 2 weeks in a row.  9/19/2017 Blessing is inconsistent though has met the safe score in all areas at least once.         Long term goal (to be met in 12 weeks):  A. Patient will pass all areas of the community mobility assessment to determine the ability to safely operate a motor vehicle. 9/19/2017 patient has driven some though is not feeling confident.      B. Patient will have adequate vision to resume safe woodcarving activities. 9/19/2017 has not attempted.       Plan  Plan:  Progress dynavison exercises and divided attention task.      Student has been instructed in and demonstrates skills necessary to carry out above stated treatment plan: Yes    Thank you for your referral to [facility].  Please call with any questions, concerns or comments.  [phone]    Jacque Recinos OTR/L  Occupational Therapist

## 2017-12-28 NOTE — PROGRESS NOTES
"Patient Information     Patient Name MRN Blessing Membreno [id number] Female 1950      Progress Notes by Jacque Recinos OT at 2017 11:09 AM     Author:  Jacque Recinos OT Service:  (none) Author Type:  OT- Occupational Therapist     Filed:  2017 12:47 PM Date of Service:  2017 11:09 AM Status:  Signed     :  Jacque Recinos OT (OT- Occupational Therapist)            [facility]  Outpatient OT - progress Note        Date of Service:                  2017                            Visit #:  10     Patient Name:  Blessing Floyd  Referring MD/Provider:  Karen Marmolejo.   Diagnosis: CVA with Visual field Deficit  Treatment Diagnosis:  Left visional field deficit  Insurance: Medicare  Onset date: 2017  Start of Care Date:    2017   Certification Dates: From:              2017                                                    Re-Cert Due: 10/13/2017  Opthalmologist: Nomi.       Subjective    \"I thin I have visual hysteria.' Blessing reports she has been seeing things over the weekend that are abnormal for her. she reports seeing windows in a room, people on a bicycle running across her visual filed though they are inside her eyes not outside. She is very concerned and anxious over these changes. She does report normal and improved vision.  she was able to see the T.V. And see the whole picture and screen.  She was able to drive into town with no negative. Experiences.    Pain Rating:    3-4 at rest for eye fatigue increased to 5-6/10  / Location:  Eye fatigue resolves with rest.     Patient Specific Functional and Pain Scales (PSFS):   2017   Clinician Instructions: Complete after the history and before the exam.    Initial Assessment: We want to know what 3 activities in your life you are unable to perform, or are having the most difficulty performing, as a result of your chief problem. Please list and score at least 3 activities " that you are unable to perform, or having the most difficulty performing, because of your chief problem.   Patient Specific Activity Scoring Scheme (score one number for each activity):   Activity Score (0-10)  0= Unable to perform activity  10= Able to perform activity at same level as before injury or problem   1. Driving 6/10   2. Dishes 4/10   3. cleaning 4/10   4. Meal prep 4/10   Totals:  14/40 = 35 % ability which relates to 65 % impairment    Patient verbally states that they understand that the information they have provided above is current and complete to the best of their knowledge.    Patient Specific Functional Scal4-CL (60% Impaired)e Modifier Scale Conversion: (patient's modifier that correlates with pt's score on PSFS): .    G codes and Modifier taken from patient completing the PSFS:    Current Primary G Code and Modifier:    Per the Patient's intake and/or assessment the Primary G Code is: Self Care .   The Patient's Impairment, Limitation or Restriction Modifier would be best described as: CL - 60% - 80% Impairment.   Goal Primary G Code and Modifier:    The Patient's G Code Goal would be: Self Care    The Patient's Impairment, Limitation or Restriction Modifier goal would be best described as: CI - 1% - 20% Impairment.        Patient Specific Functional and Pain Scales (PSFS):   8/16/2017                        Clinician Instructions: Complete after the history and before the exam.                         Initial Assessment: We want to know what 3 activities in your life you are unable to perform, or are having the most difficulty performing, as a result of your chief problem. Please list and score at least 3 activities that you are unable to perform, or having the most difficulty performing, because of your chief problem.   Patient Specific Activity Scoring Scheme (score one number for each activity):   Activity Score (0-10)  0= Unable to perform activity  10= Able to perform activity  "at same level as before injury or problem   1. Driving 0/10   2. dishes 3/10   3. Cleaning 4/10   4. Meal prep 3/10   Totals:  10/40 = 25 % ability which relates to 75% impairment                         Patient verbally states that they understand that the information they have provided above is current and complete to the best of their knowledge.                         Patient Specific Functional Scale Modifier Scale Conversion: (patient's modifier that correlates with pt's score on PSFS): 3-CL (70% Impaired).     G codes and Modifier taken from patient completing the PSFS:   Initial Primary G Code and Modifier:                         Per the Patient's intake and/or assessment the Primary G Code is: Self Care .                        The Patient's Impairment, Limitation or Restriction Modifier would be best described as: CL - 60% - 80% Impairment.   Goal Primary G Code and Modifier:                         The Patient's G Code Goal would be: Self Care                         The Patient's Impairment, Limitation or Restriction Modifier goal would be best described as: CI - 1% - 20% Impairment.          Objective    Today's Intervention:     Mode A: 3 min seconds running time. Light stays on until button is hit    - Trial #1 score: 114 average time: 1.58 ; reports \"afterburn\" still seeing the light were it was but knowing the light is on somewhere else.     Room light off -Trial #2 score: 152 average time: 1.18 not as n=much after burn'        Prolong discussion on calming patient and reassurance. Encouraged her to f/u with health care provided and recommended a higher level provider.     Home Exercise Program: 8/18/2017 letter and number cancellations, hidden words. 9/7/2017 picture find, symmetrical drawing, stroop activities. 9/12/2017 using cards     Assessment    Therapist Assessment/Response to Intervention:   Blessing experienced some abnormal visual symptoms over the weekend that has her very anxious " about having another stroke. Her symptoms are not able consistent with any visual pattern. Seem similar to hallucinations. Prior to this weekend, she felt she was doing well and had had increase confidence in driving and meal prep. She is not feeling real confident aobut things after this weekend. She remains appropriate for skilled OT to enhance her skills and safety using compensatory techniques as appropriate.       Goals:  Functional short term goals (established in collaboration with the patient, to be met in 8 weeks):  1. Patient will report the ability to perform meal prep with no to mild difficulty/ 8-10/ 10 in [facility] 9/19/2017 this has increase form 3/10 to 4/10   2. Patient will report the ability to perform house hold cleaning with no/mild difficulty/8-10/10 on PSFP 9/19/2017 is currently reporting 4/10  3. Patient will  Meet safe operation of motor vehicle guidelines on the Dynavision for 2 weeks in a row.  9/19/2017 Blessing is inconsistent though has met the safe score in all areas at least once.         Long term goal (to be met in 12 weeks):  A. Patient will pass all areas of the community mobility assessment to determine the ability to safely operate a motor vehicle. 9/19/2017 patient has driven some though is not feeling confident.      B. Patient will have adequate vision to resume safe woodcarving activities. 9/19/2017 has not attempted.       Plan  Plan:  Progress dynavison exercises and divided attention task.      Student has been instructed in and demonstrates skills necessary to carry out above stated treatment plan: Yes    Thank you for your referral to [facility].  Please call with any questions, concerns or comments.  [phone]    Jacque Recinos OTR/L  Occupational Therapist

## 2017-12-28 NOTE — PROGRESS NOTES
Patient Information     Patient Name MRN Sex Blessing Mendez [id number] Female 1950      Progress Notes by Bethany Vega RN at 2017  1:31 PM     Author:  Bethany Vega RN Service:  (none) Author Type:  NURS- Registered Nurse     Filed:  2017  1:32 PM Date of Service:  2017  1:31 PM Status:  Signed     :  Bethany Vega RN (NURS- Registered Nurse)            BP on Left lower arm reads 142/95, Pulse= 84, SaO2=94%, Temp=97.3 as patient arrived via ambulance for MRI.

## 2017-12-28 NOTE — PROGRESS NOTES
"Patient Information     Patient Name MRN Blessing Membreno [id number] Female 1950      Progress Notes by Jacque Recinos OT at 10/5/2017 10:39 AM     Author:  Jacque Recinos OT Service:  (none) Author Type:  OT- Occupational Therapist     Filed:  10/5/2017  1:21 PM Date of Service:  10/5/2017 10:39 AM Status:  Signed     :  Jacque Recinos OT (OT- Occupational Therapist)            [facility]  Outpatient OT - daily Note        Date of Service:                  10/5/2017                           Visit #:  15     Patient Name:  Blessing Floyd  Referring MD/Provider:  Karen Marmoeljo.   Diagnosis: CVA with Visual field Deficit  Treatment Diagnosis:  Left visional field deficit  Insurance: Medicare  Onset date: 2017  Start of Care Date:    2017   Certification Dates: From:              2017                                                    Re-Cert Due: 10/13/2017  Opthalmologist: Nomi.   F/U:  Karen,   Neurologist: .   Eye Appointment:     Subjective    Blessing drove herself to her appointment today. All went well. She reported that she has a difficult time follow her computer mouse.   Pain Rating:      / Location:  Eye fatigue resolves with rest.     Objective    Today's Intervention:       Mode A: 60 seconds running time. Light stays on until button is hit. Utilized a moue on the compuer to diminish light.    - Trial #1 score: 16 average time: 1.79     -Trial #2 score: 16 average time: 1.88          light board:  Lights stays on for 5 seconds;   0 secs. -trial 1: score:   53 verage reaction time:  1.13       Divided attention with 2 digit number flash for 1 sec.   60 secs.  -trial 1: score:  50  average reaction time: 1.2 1 missed digit reported \"afterburn\"  2 min  -trial 2 score: 95 average reaction time: 1.26 missed digit 0  Room light brightened reports less after burn            Home Exercise Program: 2017 " letter and number cancellations, hidden words. 9/7/2017 picture find, symmetrical drawing, stroop activities. 9/12/2017 using cards 10/5/2017 Educated on increase lighting in her house by changing fro energy saving lights.      Assessment    Therapist Assessment/Response to Intervention: Blessing is very anxious about her vision and concerned she may have other issues going on. She is performing tasks that she use to prior to this incident, however she feels they are more difficult and hr vision come and goes.  Objectively, she is performing well with no errors noted due to visual disturbances.         Goals:  Functional short term goals (established in collaboration with the patient, to be met in 8 weeks):  1. Patient will report the ability to perform meal prep with no to mild difficulty/ 8-10/ 10 in [facility] 9/19/2017 this has increase form 3/10 to 4/10   2. Patient will report the ability to perform house hold cleaning with no/mild difficulty/8-10/10 on PSFP 9/19/2017 is currently reporting 4/10  3. Patient will  Meet safe operation of motor vehicle guidelines on the Dynavision for 2 weeks in a row.  9/19/2017 Blessing is inconsistent though has met the safe score in all areas at least once.         Long term goal (to be met in 12 weeks):  A. Patient will pass all areas of the community mobility assessment to determine the ability to safely operate a motor vehicle. 9/19/2017 patient has driven some though is not feeling confident.      B. Patient will have adequate vision to resume safe woodcarving activities. 9/19/2017 has not attempted.       Plan  Plan:  Progress dynavison exercises and divided attention task.      Student has been instructed in and demonstrates skills necessary to carry out above stated treatment plan: Yes    Thank you for your referral to [facility].  Please call with any questions, concerns or comments.  [phone]    Jacque Recinos OTR/L  Occupational Therapist

## 2017-12-28 NOTE — PROGRESS NOTES
Patient Information     Patient Name MRN Blessing Membreno [id number] Female 1950      Progress Notes by Jacque Recinos OT at 2017  9:50 AM     Author:  Jacque Recinos OT Service:  (none) Author Type:  OT- Occupational Therapist     Filed:  2017  9:23 AM Date of Service:  2017  9:50 AM Status:  Signed     :  Jacque Recinos OT (OT- Occupational Therapist)            [facility]  Outpatient OT -  Daily Note       Date of Service:               2017                           Visit #:  19  Patient Name:  Blessing Floyd  Referring MD/Provider:  Karen Marmolejo.   Diagnosis: CVA with Visual field Deficit  Treatment Diagnosis:  Left visional field deficit  Insurance: Medicare  Onset date: 2017  Start of Care Date:    2017   Current Certification Dates: From:                10/12/2017                                                   Re-Cert Due:  Previous Certification dates: 2017- 10/13/2017  Opthalmologist: Nomi.   F/U:  Karen,   Neurologist: .   Eye Appointment: November:     Subjective    Blessing is very frustrated with her appointment with the Neurologist.  She did not get any of her questions answered that she was looking for.  She would like to take a break form therapy and come back after her eye appointment.     Pain Rating:  Still ranges from 1-8/10    / Location:  Eye fatigue resolves with rest.     Objective    Today's Intervention:    Column jumps across wall:    -right eye: 38 seconds   -left eye: 41seconds   -both. 36 seconds    * no headache.     Compiled home exercises and prioritized them.     Dice matrix for divided attention, visual ground scanning completed with no concerns.        Home Exercise Program: 2017 letter and number cancellations, hidden words. 2017 picture find, symmetrical drawing, stroop activities. 2017 using cards 10/5/2017 Educated on increase  lighting in her house by changing from energy saving lights.    Assessment    Therapist Assessment/Response to Intervention: Blessing continues to function well with no concerns for safety. She is able to compensate well from the residual vision loss that she has.     Goals:  Functional short term goals (established in collaboration with the patient, to be met in 8 weeks):  1. Patient will report the ability to perform meal prep with no to mild difficulty/ 8-10/ 10 in [facility] 9/19/2017 this has increase form 3/10 to 4/10     2. Patient will report the ability to perform house hold cleaning with no/mild difficulty/8-10/10 on PSFP 9/19/2017 is currently reporting 4/10  3. Patient will  Meet safe operation of motor vehicle guidelines on the Dynavision for 2 weeks in a row.  9/19/2017 Blessing is inconsistent though has met the safe score in all areas at least once. 10/31/2017 reports 8/10 for driving funcitoning     Long term goal (to be met in 12 weeks):  A. Patient will pass all areas of the community mobility assessment to determine the ability to safely operate a motor vehicle. 9/19/2017 patient has driven some though is not feeling confident. 10/31/2017 she drives into town and the [employer]      B. Patient will have adequate vision to resume safe woodcarving activities. 9/19/2017 has not attempted. 10/18/2017 started working on her carvings on 10/3. Is still working on them and has finished the eyes and cheeks.      Plan  Plan:  Progress dynavison exercises and divided attention task.      Student has been instructed in and demonstrates skills necessary to carry out above stated treatment plan: Yes    Thank you for your referral to [facility].  Please call with any questions, concerns or comments.  [phone]    Jacque Recinos OTR/L  Occupational Therapist

## 2017-12-28 NOTE — PROGRESS NOTES
Patient Information     Patient Name MRN Blessing Membreno [id number] Female 1950      Progress Notes by Jacque Recinos OT at 10/25/2017  9:18 AM     Author:  Jacque Recinos OT Service:  (none) Author Type:  OT- Occupational Therapist     Filed:  10/25/2017 10:10 AM Date of Service:  10/25/2017  9:18 AM Status:  Signed     :  Jacque Recinos OT (OT- Occupational Therapist)            [facility]  Outpatient OT -  Recertification:         Date of Service:                   10/125/2017                           Visit #:  16   Patient Name:  Blessing Floyd  Referring MD/Provider:  Karen Marmolejo.   Diagnosis: CVA with Visual field Deficit  Treatment Diagnosis:  Left visional field deficit  Insurance: Medicare  Onset date: 2017  Start of Care Date:    2017   Current Certification Dates: From:                10/12/2017                                                   Re-Cert Due:  Previous Certification dates: 2017- 10/13/2017  Opthalmologist: Nomi.   F/U:  Karen,   Neurologist: .   Eye Appointment: November:     Subjective    Blessing reports she is feeling like she has reached a plateau. She did burn her right index finger while reaching into the toaster oven. She states she did not see the edge. She continues to drive and do her wood carving.     She called her clinic and talked with a RN who reviewed all her medications and follow ups. There were numerous things like timing of taking medications and scheduling follow ups that Blessing states she was unaware of. She is pleased to have had them figured out.       Pain Rating:  Still ranges from 1-8/10    / Location:  Eye fatigue resolves with rest.    10/13/2017  Objective    Today's Intervention:    Column jumps across wall:    -right eye: 27 seconds   -left eye: 36seconds   -both. Reported headache and     Mode A: 60 seconds running time. Light stays on until button is  "hit. > 52=good; 45-51 = borderline. Trial #1 score: 56 average time: 1.07 ;     Mode B:  Light flashes for 2 second:   2 min.  Trial #1 Score 117/120  average time .97       Mode B Divided attention: 60 seconds running time: 2 digit number with 1 second flash. 35 + no missed digits = safe; 3+ missed digits = unsafe  Trial #1 Score 50/52  average time 1.12 missed digits: 0        Home Exercise Program: 8/18/2017 letter and number cancellations, hidden words. 9/7/2017 picture find, symmetrical drawing, stroop activities. 9/12/2017 using cards 10/5/2017 Educated on increase lighting in her house by changing from energy saving lights.    Assessment    Therapist Assessment/Response to Intervention: Blessing has reached a plateau with her vision. She feels she is at about 80% of PLOF. She is able to perform some tasks like driving and carving well though has difficulty with cooking and washing dishes. She continues to perform in the \"safe\" range for drivng on the dynavision tasks.         Goals:  Functional short term goals (established in collaboration with the patient, to be met in 8 weeks):  1. Patient will report the ability to perform meal prep with no to mild difficulty/ 8-10/ 10 in PSFP 9/19/2017 this has increase form 3/10 to 4/10   2. Patient will report the ability to perform house hold cleaning with no/mild difficulty/8-10/10 on PSFP 9/19/2017 is currently reporting 4/10  3. Patient will  Meet safe operation of motor vehicle guidelines on the Dynavision for 2 weeks in a row.  9/19/2017 Blessing is inconsistent though has met the safe score in all areas at least once.         Long term goal (to be met in 12 weeks):  A. Patient will pass all areas of the community mobility assessment to determine the ability to safely operate a motor vehicle. 9/19/2017 patient has driven some though is not feeling confident.      B. Patient will have adequate vision to resume safe woodcarving activities. 9/19/2017 has not attempted. " 10/18/2017 started working on her carvings on 10/3. Is still working on them and has finished the eyes and cheeks.      Plan  Plan:  Progress dynavison exercises and divided attention task.      Student has been instructed in and demonstrates skills necessary to carry out above stated treatment plan: Yes    Thank you for your referral to [facility].  Please call with any questions, concerns or comments.  [phone]    Jacque Recinos OTR/L  Occupational Therapist

## 2017-12-28 NOTE — PROGRESS NOTES
Patient Information     Patient Name MRN Blessing Membreno [id number] Female 1950      Progress Notes by Jacque Recinos OT at 2017 10:31 AM     Author:  Jacque Recinos OT Service:  (none) Author Type:  OT- Occupational Therapist     Filed:  2017 11:17 AM Date of Service:  2017 10:31 AM Status:  Signed     :  Jacque Recinos OT (OT- Occupational Therapist)            [facility]  Outpatient OT - Daily Note          Date of Service:                  2017                            Visit #:  8     Patient Name:  Blessing Floyd  Referring MD/Provider:  Karen Marmolejo.   Diagnosis: CVA with Visual field Deficit  Treatment Diagnosis:  Left visional field deficit  Insurance: Medicare  Onset date: 2017  Start of Care Date:    2017   Certification Dates: From:              2017                                                    Re-Cert Due: 10/13/2017  Maria Antonia Funk was over at  [facility] for the weekend. she became very fatigued and got a headache and had to leave early. She did all of her homework with no concerns.  She reports noticing slower processing then before the stroke. In the mornings her vision is clearer for about an hour then her eyes start to get fatigues and every thing feels darker. Her vision difficulty is worse when her back is hurting.       Pain Rating:    3-4 at rest for eye fatigue increased to 5-6/10  / Location:  Eye fatigue resolves with rest.   Objective    Today's Intervention:    Performed Stroop activities:   - slower then average and a few error. She reports needing to really focus and has a difficult time focusing if someone is talking.     Educated in card activities to facilitate scanning and focus. 3 misses in finding all the cards from ace to kirby     Requested lights be on.    Mode B: 3 min.  running time. Light stays on until button is hit.    Trial #1 score: 125/125 average time: 1.42 keep  looking in lower quadrant ;     Trial #2 score: 118/118 average time: 1.52   Reports she sees the light but does not push it until seeing it again.        Home Exercise Program: 8/18/2017 letter and number cancellations, hidden words. 9/7/2017 picture find, symmetrical drawing, stroop activities. 9/12/2017 using cards     Assessment    Therapist Assessment/Response to Intervention:   Blessing's reaction times and processing are slower today. She struggles with responding to the light in a timely manner to punch it.       Goals:  Functional short term goals (established in collaboration with the patient, to be met in 8 weeks):  1. Patient will report the ability to perform meal prep with no to mild difficulty/ 8-10/ 10 in PSFP  2. Patient will report the ability to perform house hold cleaning with no/mild difficulty/8-10/10 on PSFP  3. Patient will  Meet safe operation of motor vehicle guidelines on the Dyn[employer] for 2 weeks in a row.         Long term goal (to be met in 12 weeks):  A. Patient will pass all areas of the community mobility assessment to determine the ability to safely operate a motor vehicle.      B. Patient will have adequate vision to resume safe woodcarving activities.   Plan  Plan:  Progress dynavison exercises and divided attention task.      Student has been instructed in and demonstrates skills necessary to carry out above stated treatment plan: Yes    Thank you for your referral to [facility] & [facility].  Please call with any questions, concerns or comments.  [phone]    Jacque Recinos OTR/L  Occupational Therapist

## 2017-12-28 NOTE — PROGRESS NOTES
Patient Information     Patient Name MRN Blessing Membreno [id number] Female 1950      Progress Notes by Jacque Recinos OT at 2017 10:30 AM     Author:  Jacque Recinos OT Service:  (none) Author Type:  OT- Occupational Therapist     Filed:  2017 11:15 AM Date of Service:  2017 10:30 AM Status:  Signed     :  Jacque Recinos OT (OT- Occupational Therapist)            [facility]  Outpatient OT - daily Note        Date of Service:                                              Visit #:  12      Patient Name:  Blessing Floyd  Referring MD/Provider:  Karen Marmolejo.   Diagnosis: CVA with Visual field Deficit  Treatment Diagnosis:  Left visional field deficit  Insurance: Medicare  Onset date: 2017  Start of Care Date:    2017   Certification Dates: From:              2017                                                    Re-Cert Due: 10/13/2017  Opthalmologist: Nomi.   F/U:  Karen Em    Blessing reports she had a headache on Friday and then felt pressure in her forehead and experienced double vision for < 5 min. Some of her medications can cause headaches. She is going to see her PCP and look for a referral to a higher level of provider. She reports her vision is about 80-85 % of prior to CVA/TIA.     Pain Rating:    3-4 at rest for eye fatigue increased to 5-6/10  / Location:  Eye fatigue resolves with rest.     Objective    Today's Intervention:     convergence na didverence; WFL  Saccades: WFL  Prusuits:     Mode : 2 min seconds running time. Light stays on for 2.0 secs.     - Trial #1 score: 111/113 average time: 1.04 ;   Highest score to date felt like visual field was increasing and   deceasing making the lights easier to see.     3 min -Trial #2 score: 148/166 average time: 1..03 left quadrants: 1.07 right: upper: .98, lower: 1.03. Missed 2 lights in each quadrants.     Divided attention:  Light stays on  until hit. Number flashes for 1 secs. 42+ = safe driving.   47/47 no missed numbers. Reaction time: 1.27        Home Exercise Program: 8/18/2017 letter and number cancellations, hidden words. 9/7/2017 picture find, symmetrical drawing, stroop activities. 9/12/2017 using cards     Assessment    Therapist Assessment/Response to Intervention:    She feels he vision is still changing at time improving in visual fields. She feels as if her vision is at 80% - 85% of PLOF.     Goals:  Functional short term goals (established in collaboration with the patient, to be met in 8 weeks):  1. Patient will report the ability to perform meal prep with no to mild difficulty/ 8-10/ 10 in PSFP 9/19/2017 this has increase form 3/10 to 4/10   2. Patient will report the ability to perform house hold cleaning with no/mild difficulty/8-10/10 on PSFP 9/19/2017 is currently reporting 4/10  3. Patient will  Meet safe operation of motor vehicle guidelines on the Dyn[employer] for 2 weeks in a row.  9/19/2017 Blessing is inconsistent though has met the safe score in all areas at least once.         Long term goal (to be met in 12 weeks):  A. Patient will pass all areas of the community mobility assessment to determine the ability to safely operate a motor vehicle. 9/19/2017 patient has driven some though is not feeling confident.      B. Patient will have adequate vision to resume safe woodcarving activities. 9/19/2017 has not attempted.       Plan  Plan:  Progress dynavison exercises and divided attention task.      Student has been instructed in and demonstrates skills necessary to carry out above stated treatment plan: Yes    Thank you for your referral to [facility].  Please call with any questions, concerns or comments.  [phone]    Jacque Recinos OTR/L  Occupational Therapist

## 2017-12-28 NOTE — PROGRESS NOTES
"Patient Information     Patient Name MRN Blessing Membreno [id number] Female 1950      Progress Notes by Jacque Recinos OT at 2017  8:59 AM     Author:  Jacque Recinos OT Service:  (none) Author Type:  OT- Occupational Therapist     Filed:  2017 10:39 AM Date of Service:  2017  8:59 AM Status:  Signed     :  Jacque Recinos OT (OT- Occupational Therapist)            [facility]  Outpatient OT - Daily Note          Date of Service:                  2017                            Visit #:  3     Patient Name:  Blessing Floyd  Referring MD/Provider:  Karen Marmolejo.   Diagnosis: CVA with Visual field Deficit  Treatment Diagnosis:  Left visional field deficit  Insurance: Medicare  Onset date: 2017  Start of Care Date:    2017   Certification Dates: From:              2017                                                    Re-Cert Due: 10/13/2017  Subjective    Blessing performed her HEP of digit cancellation and word find. She noticed her eyes would become \"wobly\" when fatigued.  She reports \"spidery\"headaches of 2/10 after doing her HEP.       Pain Rating:    3-4 at rest for eye fatigue increased to 5-6/10  / Location:  Eye fatigue resolves with rest.   Objective    Today's Intervention:    Educated in 6 line scramble. Performed with right, left and both eyes. Difficulty with left eye. mising 2 lines. Reports not able to see X and M in the lower left corner. Handout provided.     Educated in corner jump with hand out provided.     Given more handout for hidden words   Mode A: 120 seconds running time.    Trial #1 score: 85  average time: 1.41 ;    Trial #2 score: 115 average time: 1.04 ;  Reported eye fatigue after 45 secs.      Mode B: 60 seconds running time. Light flashes for 1 second:  > 42= safe  35-41= borderline    Not turning head  -Trial #1 Score 41/67  average time .83    Turning head    -Trial #2: score 19/62 average times " 1.01 T       Mode B :  60 seconds running time: light stays on for 5 secs.   Turning head - Trial #1 Score 39/39  average time 1:53         Home Exercise Program: 8/18/2017 letter and number cancellations, hidden words.     Assessment    Therapist Assessment/Response to Intervention:  Blessing has trained herself t not move her head while looking for objects. when she does turn her head, she has difficulty locating the light and striking it. Her scores on the MFVPT-4 are with in normal limites for her peer group.        Goals:  Functional short term goals (established in collaboration with the patient, to be met in 8 weeks):  1. Patient will report the ability to perform meal prep with no to mild difficulty/ 8-10/ 10 in PSFP  2. Patient will report the ability to perform house hold cleaning with no/mild difficulty/8-10/10 on PSFP  3. Patient will  Meet safe operation of motor vehicle guidelines on the [employer] for 2 weeks in a row.         Long term goal (to be met in 12 weeks):  A. Patient will pass all areas of the community mobility assessment to determine the ability to safely operate a motor vehicle.      B. Patient will have adequate vision to resume safe woodcarving activities.   Plan  Plan:  Progress dynavison exercises, initiate pursuits with flashlight,     Student has been instructed in and demonstrates skills necessary to carry out above stated treatment plan: Yes    Thank you for your referral to [facility].  Please call with any questions, concerns or comments.  [phone]    Jacque Recinos OTR/L  Occupational Therapist

## 2017-12-28 NOTE — PROGRESS NOTES
Patient Information     Patient Name MRN Blessing Membreno [id number] Female 1950      Progress Notes by Jacque Recinos OT at 2017 11:56 AM     Author:  Jacque Recinos OT Service:  (none) Author Type:  OT- Occupational Therapist     Filed:  2017 12:02 PM Date of Service:  2017 11:56 AM Status:  Signed     :  Jacque Recinos OT (OT- Occupational Therapist)            [facility]  Outpatient OT - Daily Note          Date of Service:                  2017                            Visit #:  6     Patient Name:  Blessing Floyd  Referring MD/Provider:  Karen Marmolejo.   Diagnosis: CVA with Visual field Deficit  Treatment Diagnosis:  Left visional field deficit  Insurance: Medicare  Onset date: 2017  Start of Care Date:    2017   Certification Dates: From:              2017                                                    Re-Cert Due: 10/13/2017  Subjective    Blessing tried performing the word search while watching a movie. she reported it was very difficult but got easier. She stated then she woke up the  day and was able to see her face completely. she also reports less eye strain and feeling like the cap on her left side is less.   Pain Rating:    3-4 at rest for eye fatigue increased to 5-6/10  / Location:  Eye fatigue resolves with rest.   Objective    Today's Intervention:    Scanning:   Column jumps holding paper in hand 10 rows: Right eye, left eye and both.  Reported some eye fatigue.                 Mode B:   Light flashes for 2 second, divided attention: number flashes for 1 second:    60 seconds -score:  43/43  Reaction time: 1.38  Missed digits: 0   120 seconds. -score: 80/80  Reaction time; 1.5  missed digits: 8 reported eyes were fatigued             Home Exercise Program: 2017 letter and number cancellations, hidden words.     Assessment    Therapist Assessment/Response to Intervention:     Blessing has difficulty with  staying on track and keeping focused. This improved while at home with the divided attention. Task.   Goals:  Functional short term goals (established in collaboration with the patient, to be met in 8 weeks):  1. Patient will report the ability to perform meal prep with no to mild difficulty/ 8-10/ 10 in PSFP  2. Patient will report the ability to perform house hold cleaning with no/mild difficulty/8-10/10 on PSFP  3. Patient will  Meet safe operation of motor vehicle guidelines on the [employer] for 2 weeks in a row.         Long term goal (to be met in 12 weeks):  A. Patient will pass all areas of the community mobility assessment to determine the ability to safely operate a motor vehicle.      B. Patient will have adequate vision to resume safe woodcarving activities.   Plan  Plan:  Progress dynavison exercises and divided attention.     Student has been instructed in and demonstrates skills necessary to carry out above stated treatment plan: Yes    Thank you for your referral to [facility].  Please call with any questions, concerns or comments.  [phone]    Jacque Recinos OTR/L  Occupational Therapist

## 2017-12-28 NOTE — PROGRESS NOTES
"Patient Information     Patient Name MRN Blessing Membreno [id number] Female 1950      Progress Notes by Jacque Recinos OT at 2017 10:23 AM     Author:  Jacque Recinos OT Service:  (none) Author Type:  OT- Occupational Therapist     Filed:  2017 11:13 AM Date of Service:  2017 10:23 AM Status:  Signed     :  Jacque Recinos OT (OT- Occupational Therapist)            [facility]  Outpatient OT - Daily Note          Date of Service:                                              Visit #:  5     Patient Name:  Blessing Floyd  Referring MD/Provider:  Karen Marmolejo.   Diagnosis: CVA with Visual field Deficit  Treatment Diagnosis:  Left visional field deficit  Insurance: Medicare  Onset date: 2017  Start of Care Date:    2017   Certification Dates: From:              2017                                                    Re-Cert Due: 10/13/2017  Subjective    \" I left here after last session, went home, ate, and went to bed.\" she reports her eyes see differently and she does have double vision but one becomes dominate. This has all been going on for the last 5 years.     Pain Rating:    3-4 at rest for eye fatigue increased to 5-6/10  / Location:  Eye fatigue resolves with rest.   Objective    Today's Intervention:    Scanning:   Column jumps standing 5' from wall all across wall. Outside to inside: Right eye, left eye and both.           Inside to outside: right eye, left eye, both. Difficulty keeping track of what letters she is on .         Mode B: 60 seconds running time. Light flashes for 2 second,     -score: 41/45  Reaction time: 1.25   -score: 39/47  Reaction time; 1.13   -score: 47/50 reaction time: 1.13  120 seconds:    -score: 89/97  Reaction time: 1.16 reports mind wondering.    -score: 78/92 reaction time: 1.17 reports \"floater\" that are blocking out the lights.   Mode C: 60 seconds: lights single red light travels " "around 5th outer ring.    -umberto followed light CC/CCW          Home Exercise Program: 8/18/2017 letter and number cancellations, hidden words.     Assessment    Therapist Assessment/Response to Intervention:    \"I don't feel the eye strain like I use to. \" Umberto had less eye strain and fatigue today. There is no consistent visual quadrant that is being missed.  Umberto has difficulty keeping focused on the task at hand. She reports her mind wonders and she misses lights.     Goals:  Functional short term goals (established in collaboration with the patient, to be met in 8 weeks):  1. Patient will report the ability to perform meal prep with no to mild difficulty/ 8-10/ 10 in PSFP  2. Patient will report the ability to perform house hold cleaning with no/mild difficulty/8-10/10 on PSFP  3. Patient will  Meet safe operation of motor vehicle guidelines on the [employer] for 2 weeks in a row.         Long term goal (to be met in 12 weeks):  A. Patient will pass all areas of the community mobility assessment to determine the ability to safely operate a motor vehicle.      B. Patient will have adequate vision to resume safe woodcarving activities.   Plan  Plan:  Progress dynavison exercises, initiate pursuits with flashlight,     Student has been instructed in and demonstrates skills necessary to carry out above stated treatment plan: Yes    Thank you for your referral to [facility].  Please call with any questions, concerns or comments.  [phone]    Jacque Recinos OTR/L  Occupational Therapist         "

## 2017-12-30 NOTE — INITIAL ASSESSMENTS
Patient Information     Patient Name MRN Blessing Membreno [id number] Female 1950      Initial Assessments by Jacque Recinos OT at 2017 11:02 AM     Author:  Jacque Recinos OT Service:  (none) Author Type:  OT- Occupational Therapist     Filed:  2017  2:59 PM Date of Service:  2017 11:02 AM Status:  Signed     :  Jacque Recinos OT (OT- Occupational Therapist)            [facility]  Outpatient OT   Neurological Initial Evaluation      Date of Service:  2017   Visit #:  1    Patient Name:  Blessing Floyd  Referring MD/Provider:  Karen Marmolejo.   Diagnosis: CVA with Visual field Deficit  Treatment Diagnosis:  Left visional field deficit  Insurance: Medicare  Onset date: 2017  Start of Care Date:    2017   Certification Dates: From:  2017               Re-Cert Due: 10/13/2017    Next MD visit: PRN    Subjective:    Patient is a 67 year old female who states on the  she lost her left peripheral vision.  She looked at her cat and could not see her. She moved her head and saw her. She has had this before. She reports that she has had double vison off and on for years and about 2 years ago also had lost the left peripherial vision that cleared up. This time her vision is not clearing.  On 2017 she had an MRI that showed an Occipital CVA due to Occulusion of right posterior cerebral artery.  She reports he vision has improved some, however it is still limiting. She it not able to tell if her dishes are clean, she was cutting a strawberry and could not see that is had mold on it. Blessing states she does not feel safe driving or resuming wood carving with her current vision deficits.       Pain Rating:    Denies pain / Location:  NA  Living Situation:  Independent in living situation with her cat. 17 miles outside of [place]. She gethers her own food.     Preadmission Functional Ability: Independent  Precautions:   NA  Cognition:  Oriented to Person, Place, and Time. yes     Were cultural / age or other special adaptations needed? No      Patient is a vulnerable adult: No      Patient is aware of diagnosis: Yes      Risks and benefits explained: Yes  PMH: No past medical history on file. reviewed with patient .   Current functional abilities:   Lives alone  Current functional limitations: carving,driving, household tasks.        Fall Risk Screening:  No risk factors identified    Patient Specific Functional and Pain Scales (PSFS):   8/16/2017   Clinician Instructions: Complete after the history and before the exam.    Initial Assessment: We want to know what 3 activities in your life you are unable to perform, or are having the most difficulty performing, as a result of your chief problem. Please list and score at least 3 activities that you are unable to perform, or having the most difficulty performing, because of your chief problem.   Patient Specific Activity Scoring Scheme (score one number for each activity):   Activity Score (0-10)  0= Unable to perform activity  10= Able to perform activity at same level as before injury or problem   1. Driving 0/10   2. dishes 3/10   3. Cleaning 4/10   4. Meal prep 3/10   Totals:  10/40 = 25 % ability which relates to 75% impairment    Patient verbally states that they understand that the information they have provided above is current and complete to the best of their knowledge.    Patient Specific Functional Scale Modifier Scale Conversion: (patient's modifier that correlates with pt's score on PSFS): 3-CL (70% Impaired).    G codes and Modifier taken from patient completing the PSFS:   Initial Primary G Code and Modifier:    Per the Patient's intake and/or assessment the Primary G Code is: Self Care .   The Patient's Impairment, Limitation or Restriction Modifier would be best described as: CL - 60% - 80% Impairment.   Goal Primary G Code and Modifier:    The Patient's G Code  Goal would be: Self Care    The Patient's Impairment, Limitation or Restriction Modifier goal would be best described as: CI - 1% - 20% Impairment.       Objective:  Imaging:  EXAM:  MR HEAD BRAIN WWO     HISTORY:  Peripheral vision loss, left.     TECHNIQUE:  Sagittal T1, axial T1, T2, FLAIR, diffusion, echo planar as well as axial and 3-D postcontrast imaging of the whole brain was performed.     COMPARISON:  None.      FINDINGS:    There is a moderate area of restricted diffusion involving much of the medial right occipital lobe as well as a portion of the right corpus callosum. These areas demonstrate T2 hyperintensity and local sulcal effacement, suggesting gyral edema. Some subtle hyperenhancement is seen, potentially reflecting slow arterial flow either due to upstream obstruction or sulcal effacement resulting in venous edema. Another punctate focus of restricted diffusion is suggested in the right cerebellum, less well seen on the ADC map but still felt to reflect restricted diffusion.      No abnormal extra-axial collection, hydrocephalus or midline shift is seen. The basal cisterns are preserved. No concerning gradient susceptibility is identified. No areas of abnormal enhancement remote from the changes described above are seen. The major intracranial vascular flow voids are preserved.      The T1 marrow signal is unremarkable.     IMPRESSION: Moderate acute to subacute right PCA infarct involving a large portion of the medial right occipital lobe. Punctate restricted diffusion in the right cerebellar white matter, likely reflecting a simultaneous infarct. Consider MRA/CTA for further assessment.       Hand Dominance:   Right    ROM and/or MMT Norms Right  WNL Left  WNL        Baseline Dynavision: goal is 52+ for safe driving.   Mod A: light stays on until pressed. 0 seconds:    -score: 30 LUQ: 7 2.20 RUQ: 10  1.55  LLQL 9 1.95  RLQ: 7  Reaction Time: 1.59    Visual Perception:   Motor-Free Visual  Perception Test (MFVPT-R): initiated  Visual Motor:  Visual fields: slight deficit on left to 45 degrees.    Visual tracking: right eye is jerky     Other findings:  Eyes converge equally  Assessment:    Signs and symptoms are consistent with:  S/P right occipital CVA resulting in left peripheral field deificits    Problem list includes:  Mild left visual field cut.     Patient s goal:  To return to safe driving and get her full vision back.     Functional short term goals (established in collaboration with the patient, to be met in 8 weeks):  1. Patient will report the ability to perform meal prep with no to mild difficulty/ 8-10/ 10 in PSFP  2. Patient will report the ability to perform house hold cleaning with no/mild difficulty/8-10/10 on PSFP  3. Patient will  Meet safe operation of motor vehicle guidelines on the Dynavision for 2 weeks in a row.       Long term goal (to be met in 12 weeks):  A. Patient will pass all areas of the community mobility assessment to determine the ability to safely operate a motor vehicle.     B. Patient will have adequate vision to resume safe woodcarving activities.     Rehab potential: Good for stated goals.    Risk, benefits, and alternatives were discussed with patient. Patient agrees with the plan of care.    Today s treatment included: evalution, Emanuel Unstructured Array:   Scan Pattern:  Organized         Describe:  Left to right, right to left. Used finger to guide vision      Number of Errors:  1, time: 1:33, >2 minutes or errors > 8 is concern for visual field cut or visual inattention.  Intersecting lines 29.87   No consistent pattern of field cut. baseline dynaviion.       Response to intervention:  Blessing is compensating for her left visual field deficit.         Plan:    Treatment Plan (may include any combination of the following):    Home programming   Therapeutic exercise   Self-care/home managment   Manual therapy   Therapeutic  activities   NMR   Ultrasound   Phonophoresis (10% Ketoprofen)   Iontophoresis (.2% Dexamethasone)   Superficial modalities prn   Electrical stimulation, including NMES   Orthotic management   Standardized testing   Cognitive skills development   ROM hand measurements  Other    Frequency/Duration:  16 visits in 8 weeks as needed  10/13/2017  Plan for next visit: complete MFVPT-, initiate home work .  Letter cancellation.     Student has been instructed in and demonstrates skills necessary to carry out above stated treatment plan.     Jacque Recinos OTR/L  Occupational Therapist

## 2018-01-29 ENCOUNTER — AMBULATORY - GICH (OUTPATIENT)
Dept: PHYSICAL THERAPY | Facility: OTHER | Age: 68
End: 2018-01-29

## 2018-01-29 DIAGNOSIS — H53.40 VISUAL FIELD DEFECTS: ICD-10-CM

## 2018-01-29 DIAGNOSIS — I63.531 CEREBRAL INFARCTION DUE TO UNSPECIFIED OCCLUSION OR STENOSIS OF RIGHT POSTERIOR CEREBRAL ARTERY (H): ICD-10-CM

## 2018-02-01 ENCOUNTER — HOSPITAL ENCOUNTER (OUTPATIENT)
Dept: PHYSICAL THERAPY | Facility: OTHER | Age: 68
Setting detail: THERAPIES SERIES
End: 2018-02-01

## 2018-02-01 DIAGNOSIS — I63.531 CEREBRAL INFARCTION DUE TO UNSPECIFIED OCCLUSION OR STENOSIS OF RIGHT POSTERIOR CEREBRAL ARTERY (H): ICD-10-CM

## 2018-02-01 DIAGNOSIS — H53.40 VISUAL FIELD DEFECTS: ICD-10-CM

## 2018-02-12 ENCOUNTER — DOCUMENTATION ONLY (OUTPATIENT)
Dept: FAMILY MEDICINE | Facility: OTHER | Age: 68
End: 2018-02-12

## 2018-02-13 NOTE — DISCHARGE SUMMARY
Patient Information     Patient Name MRN Blessing Membreno [id number] Female 1950      Discharge Summaries by Jacque Recinos OT at 2018  9:38 AM     Author:  Jacque Recinos OT Service:  (none) Author Type:  OT- Occupational Therapist     Filed:  2018  9:40 AM Date of Service:  2018  9:38 AM Status:  Signed     :  Jacque Recinos OT (OT- Occupational Therapist)            [facility]  Outpatient OT - Discharge Summary    Date of discharge: 2018      Date of last Service:               2017                           Visit #:  19  Patient Name:  Blessing Floyd  Referring MD/Provider:  Karen Marmolejo.   Diagnosis: CVA with Visual field Deficit  Treatment Diagnosis:  Left visional field deficit  Insurance: Medicare  Onset date: 2017  Start of Care Date:    2017   Current Certification Dates: From:                10/12/2017                                                   Re-Cert Due:  Previous Certification dates: 2017- 10/13/2017  Opthalmologist: Nomi.   F/U:  Karen,   Neurologist: .   Eye Appointment: November:             Reason for Discharge:   Blessing requested that her chart be left open in case she required further treatment after her eye exam. She later dropped of the exam report stating her visual field is now fully intact and she is on longer requiring treatment. She was very pleased with the outcome.     Progress from Initial to Discharge (if applicable):    Vision returned    Further Recommendations:    None        Patient is discharged from Occupational Therapy    Thank you for your referral to [facility].  Please call with any questions, concerns or comments.  [phone]            Jacque Recinos OTR/L  Occupational Therapist

## 2018-02-13 NOTE — INITIAL ASSESSMENTS
Patient Information     Patient Name MRN Blessing Membreno [id number] Female 1950      Initial Assessments by Jacque Recinos OT at 2018 10:19 AM     Author:  Jacque Recinos OT Service:  (none) Author Type:  OT- Occupational Therapist     Filed:  2018 12:32 PM Date of Service:  2018 10:19 AM Status:  Signed     :  Jacque Recinos OT (OT- Occupational Therapist)            [facility]  Outpatient OT   Neurological Initial Evaluation      Date of Service:  2018   Visit #:   1    Patient Name:  Blessing Floyd  Referring MD/Provider: Karen Marmolejo  Diagnosis: temporal lobe CVA due to right posterior cerebral artery occlusion.   Treatment Diagnosis:  Visual field deficit  Insurance:  Medicare  Onset date:  2017  Start of Care Date:    2018   Certification Dates: From:  2018               Re-Cert Due: 3/29/2018    Next MD visit:    Subjective:    Blessing is a 67 year old female who experienced a CVA due to an occlusion of the right posterior cerebral artery on 2017. She underwent skilled OT from  2017 to 2017 due to a visual field deficit consistent with left homoanopsia hemianopsia. She was re-evaluated by the eye doctor earlier this year and her visual filed deficit had resolved. She returns today because she thinks her visual field  Deficit is returning. She reports difficulty seeing in the upper left upper quadrant. This comes and goes. She also reports seeing cats and other strange objects again.     Pain Rating:    Denies pain today. Reports occasional headache possible form her C-Pap mask / Location:  Back of head.   Living Situation:  Independent in living situation alone    Preadmission Functional Ability: Independent in all areas of self cares. She drives but limits herself .   Precautions:  NA  Cognition:  Oriented to Person, Place, and Time. yes    Were cultural / age or other special adaptations needed? No       "Patient is a vulnerable adult: No      Patient is aware of diagnosis: Yes      Risks and benefits explained: Yes  PMH: No past medical history on file.  Reviewed with patient  diabetes  Current functional abilities:  Current functional limitations:      Fall Risk Screening:  Have you fallen 2 more more times in the past year? no  Have you fallen aRUnd had an injury in the past year? no    If the patient answers yes to either of the above questions, a Timed Up and Go (TUG) test will be performed. A referral to physical therapy will be initiated if: 1) TUG test of greater than 13.5 seconds, or 2) inability to participate in the TUG test due to needing assistance by another person for ambulation.     Dynavision:  52 hits average reaction time: 1.14  score of 52 in 60 seconds indicates good association with driving.     RU: hits 13  Average reaction time1.28  RL: hits 13 Average reaction time 1.13   BELA: hits: 15 Average reaction time1.13  LL: hits: 11 Average reaction time 1.10       Visual Motor:  Visual fields:  limited in upper right   Visual tracking: smooth/      Other findings:  Scanning to locate alphabet:   Both eyes: 1:33  Right eye:  1:39  Left eye:   1:42  Assessment:    Signs and symptoms are consistent with: visual field deficits .     Problem list includes:  Nerve damage for CVA, visual filed disruption     Patient s goal: \"I want to see if I am getting worse\" ( in her vision)    Functional short term goals (established in collaboration with the patient, to be met in 8 weeks):  1. Patient will score within safe range for driving on dynavision testing.   2. Patient will demonstrate the ability to compensate for any visual filed deficits present.     Long term goal (to be met in 12 weeks):  1. Patient will be able to perform daily tasks indepdnetly with compensation as needed for visual field deficits.     Rehab potential: Good for stated goals.    Risk, benefits, and alternatives were discussed with " patient. Patient agrees with the plan of care.    Today s treatment included: evaluation, assessed visual fields, educated in HEP and given patient folder with handouts. Dynavision.       Response to intervention: no significant deficits noted.       Per the Patient's intake and/or assessment the Primary G Code is Other PT/OT Primary .    The Patient's Impairment, Limitation or Restriction Modifier would be best described as CI - 1% - 20% Impairment.     The Patient's G Code Goal would be Other PT/OT Primary     The Patient's Impairment, Limitation or Restriction Modifier would be best described as CI - 1% - 20% Impairment.             Plan:    Treatment Plan (may include any combination of the following):    Home programming   Therapeutic exercise   Self-care/home managment   Manual therapy   Therapeutic activities   NMR   Ultrasound   Phonophoresis (10% Ketoprofen)   Iontophoresis (.2% Dexamethasone)   Superficial modalities prn   Electrical stimulation, including NMES   Orthotic management   Standardized testing   Cognitive skills development   ROM hand measurements  Other    Frequency/Duration:  4 visits in 8 weeks as needed    Plan for next visit:  As needed    Student has been instructed in and demonstrates skills necessary to carry out above stated treatment plan.     Jacque Recinos OTR/L  Occupational Therapist            X____________________________________________________    Physician Signature                     Date  Time    Karen Duenas

## 2018-05-30 ENCOUNTER — TRANSFERRED RECORDS (OUTPATIENT)
Dept: HEALTH INFORMATION MANAGEMENT | Facility: OTHER | Age: 68
End: 2018-05-30

## 2018-05-30 ENCOUNTER — MEDICAL CORRESPONDENCE (OUTPATIENT)
Dept: HEALTH INFORMATION MANAGEMENT | Facility: OTHER | Age: 68
End: 2018-05-30

## 2018-06-05 ENCOUNTER — HOSPITAL ENCOUNTER (OUTPATIENT)
Dept: MRI IMAGING | Facility: OTHER | Age: 68
End: 2018-06-05
Attending: FAMILY MEDICINE
Payer: COMMERCIAL

## 2018-06-05 ENCOUNTER — HOSPITAL ENCOUNTER (OUTPATIENT)
Dept: MRI IMAGING | Facility: OTHER | Age: 68
Discharge: HOME | End: 2018-06-05
Attending: PHYSICAL MEDICINE & REHABILITATION | Admitting: PHYSICAL MEDICINE & REHABILITATION
Payer: COMMERCIAL

## 2018-06-05 DIAGNOSIS — I63.531 CEREBROVASCULAR ACCIDENT (CVA) DUE TO OCCLUSION OF RIGHT POSTERIOR CEREBRAL ARTERY (H): ICD-10-CM

## 2018-06-05 DIAGNOSIS — M54.16 LUMBAR RADICULOPATHY: ICD-10-CM

## 2018-06-05 DIAGNOSIS — M25.551 PAIN OF RIGHT HIP JOINT: ICD-10-CM

## 2018-06-05 DIAGNOSIS — I63.9 OCCIPITAL STROKE (H): ICD-10-CM

## 2018-06-05 PROCEDURE — 70551 MRI BRAIN STEM W/O DYE: CPT

## 2018-06-05 PROCEDURE — 72148 MRI LUMBAR SPINE W/O DYE: CPT

## 2018-06-18 DIAGNOSIS — I63.9 CEREBROVASCULAR ACCIDENT (H): Primary | ICD-10-CM

## 2018-06-21 ENCOUNTER — HOSPITAL ENCOUNTER (OUTPATIENT)
Dept: OCCUPATIONAL THERAPY | Facility: OTHER | Age: 68
Setting detail: THERAPIES SERIES
End: 2018-06-21
Attending: CLINICAL NURSE SPECIALIST
Payer: COMMERCIAL

## 2018-06-21 PROCEDURE — G8987 SELF CARE CURRENT STATUS: HCPCS | Mod: GO,CI | Performed by: OCCUPATIONAL THERAPIST

## 2018-06-21 PROCEDURE — G8988 SELF CARE GOAL STATUS: HCPCS | Mod: GO,CI | Performed by: OCCUPATIONAL THERAPIST

## 2018-06-21 PROCEDURE — 97530 THERAPEUTIC ACTIVITIES: CPT | Mod: GO | Performed by: OCCUPATIONAL THERAPIST

## 2018-06-21 PROCEDURE — G8989 SELF CARE D/C STATUS: HCPCS | Mod: GO,CI | Performed by: OCCUPATIONAL THERAPIST

## 2018-06-21 PROCEDURE — 97165 OT EVAL LOW COMPLEX 30 MIN: CPT | Mod: GO | Performed by: OCCUPATIONAL THERAPIST

## 2018-06-21 PROCEDURE — 97112 NEUROMUSCULAR REEDUCATION: CPT | Mod: GO | Performed by: OCCUPATIONAL THERAPIST

## 2018-06-21 NOTE — PROGRESS NOTES
[facility]          OUTPATIENT OCCUPATIONAL THERAPY  EVALUATION  PLAN OF TREATMENT FOR OUTPATIENT REHABILITATION  (COMPLETE FOR INITIAL CLAIMS ONLY)  Patient's Last Name, First Name, M.I.  YOB: 1950  Blessing Floyd                        Provider's Name  [facility] Medical Record No.  [id number]                               Onset Date:     07/24/17   Start of Care Date:     06/21/18   Type:     ___PT   _X_OT   ___SLP Medical Diagnosis:     S/p CVA due to right posterior cerebral artery occlusion, continiued vision problems                          OT Diagnosis:     visual field disturbance  Visits from SOC:  1   _________________________________________________________________________________  Plan of Treatment/Functional Goals:             Goals  Goal Identifier: ocular motor, scanning, pursuits   Goal Description: Blessing will complete visual scanning tasks with both eyes, right eye, and left eye to determine ocular motor funciton   Target Date: 06/21/18  Date Met: 06/21/18  Goal Identifier: visual motor  Goal Description:  Blessing will participate in visual motor tasks to determine visual processing  Target Date: 06/21/18  Date Met: 06/21/18  Goal Identifier: visual field  Goal Description: Blessing will participate in visual field assessment to determine any field cuts.   Target Date: 06/21/18  Date Met: 06/21/18                   Jacque Recinos, OT            I CERTIFY THE NEED FOR THESE SERVICES FURNISHED UNDER        THIS PLAN OF TREATMENT AND WHILE UNDER MY CARE .             Physician Signature               Date    X_____________________________________________________                       ,     ,                 Referring Physician: Dr. Cortez     Initial Assessment        See Epic Evaluation      Start Of Care Date: 06/21/18

## 2018-06-21 NOTE — PROGRESS NOTES
06/21/18 0900   Quick Adds   Type of Visit Initial Outpatient Occupational Therapy Evaluation   General Information   Start Of Care Date 06/21/18   Referring Physician Dr. Cortez   Orders Evaluate and treat as indicated   Medical Diagnosis S/p CVA due to right posterior cerebral artery occlusion, continiued vision problems   Onset of Illness/Injury or Date of Surgery 07/24/17   Surgical/Medical History Reviewed Yes   Additional Occupational Profile Info/Pertinent History of Current Problem Blessing experienced a CVA on 7/24/2018 resulting in left homoanopsia hemianopsia. she under went skilled OT and her visual filef returned. josiah wwas re-evaluated by an opthamologist who who reported her visual field deficiets had resolved. She returns today because she has started to have some different symptoms in her vision and she would like to know if her visual field is changing. She reports seeing cats again as well as a yellow glow in her right periphery. SHe also notes that occsionally she thinks she has holes in her visual field. her MRI results show expected evolution of right occipital infarct  She is seeing a new provider and is very pleased wiht the care and she is receiving.    Pain   Patient currently in pain Yes   Pain location left hip and back. due to done on done in hip    Pain rating 5/10   Fall Risk Screen   Fall screen completed by OT   Have you fallen 2 or more times in the past year? No   Have you fallen and had an injury in the past year? No   Cognitive Status Examination   Orientation Orientation to person, place and time   Visual Perception   Visual Perception No deficits were identified;Wears glasses   Visual Field WFL   Visual Attention WFL   Oculomotor WFL smooth saccades and pursuits.    Hand Strength   Hand Dominance Right   OT Goal 1   Goal Identifier ocular motor, scanning, pursuits    Goal Description Blessing will complete visual scanning tasks with both eyes, right eye, and left eye to determine  ocular motor funciton    Target Date 06/21/18   Date Met 06/21/18   OT Goal 2   Goal Identifier visual motor   Goal Description Blessing will participate in visual motor tasks to determine visual processing   Target Date 06/21/18   Date Met 06/21/18   OT Goal 3   Goal Identifier visual field   Goal Description Blessing will participate in visual field assessment to determine any field cuts.    Target Date 06/21/18   Date Met 06/21/18   Clinical Impression   Criteria for Skilled Therapeutic Interventions Met No;Evaluation only   OT Diagnosis visual field disturbance    Influenced by the following impairments reported symptoms   Identified Performance Deficits none    Clinical Decision Making (Complexity) Low complexity   Risks and Benefits of Treatment have been explained. Yes   Patient, Family & other staff in agreement with plan of care Yes   Clinical Impression Comments Blessing seems anxious about her vision. she is overly sensitive to any perceived changes. she has not experienced any functional declines in self cares or IADLs due to her symptoms.    Education Assessment   Barriers To Learning Hearing;No Barriers   Preferred Learning Style Listening   Self Care   Self Care: Current Status , Goal ,  Discharge -Zyub Only- Modifier the same for all G-codes CI: 1-19% impairment   Self Care: Current & Discharge Modifier Rationale-Eval Only CI: 1-19% impairment  CI: 1-19% impairment   Total Evaluation Time   Total Evaluation Time 15

## 2019-11-20 ENCOUNTER — TRANSFERRED RECORDS (OUTPATIENT)
Dept: HEALTH INFORMATION MANAGEMENT | Facility: CLINIC | Age: 69
End: 2019-11-20

## 2020-07-03 ENCOUNTER — TRANSFERRED RECORDS (OUTPATIENT)
Dept: HEALTH INFORMATION MANAGEMENT | Facility: OTHER | Age: 70
End: 2020-07-03

## 2020-07-03 ENCOUNTER — MEDICAL CORRESPONDENCE (OUTPATIENT)
Dept: HEALTH INFORMATION MANAGEMENT | Facility: OTHER | Age: 70
End: 2020-07-03

## 2020-07-27 DIAGNOSIS — M25.559 HIP PAIN: Primary | ICD-10-CM

## 2020-07-27 PROBLEM — E11.9 TYPE 2 DIABETES MELLITUS WITHOUT COMPLICATION, WITHOUT LONG-TERM CURRENT USE OF INSULIN (H): Status: ACTIVE | Noted: 2017-08-07

## 2020-07-27 PROBLEM — E66.01 MORBID OBESITY WITH BMI OF 40.0-44.9, ADULT (H): Status: ACTIVE | Noted: 2017-08-07

## 2020-07-27 PROBLEM — Z92.89: Status: ACTIVE | Noted: 2018-05-09

## 2020-07-27 PROBLEM — I63.531 CEREBROVASCULAR ACCIDENT (CVA) DUE TO OCCLUSION OF RIGHT POSTERIOR CEREBRAL ARTERY (H): Status: ACTIVE | Noted: 2017-07-27

## 2020-07-27 PROBLEM — I10 ESSENTIAL HYPERTENSION: Status: ACTIVE | Noted: 2017-01-06

## 2020-07-27 PROBLEM — G47.00 INSOMNIA, UNSPECIFIED TYPE: Status: ACTIVE | Noted: 2018-01-25

## 2020-07-27 PROBLEM — R29.898 RIGHT LEG WEAKNESS: Status: ACTIVE | Noted: 2018-01-25

## 2020-07-27 PROBLEM — N39.3 STRESS INCONTINENCE: Status: ACTIVE | Noted: 2017-08-16

## 2020-07-27 RX ORDER — CYCLOBENZAPRINE HCL 10 MG
TABLET ORAL
COMMUNITY
Start: 2018-05-09

## 2020-07-27 RX ORDER — TRAZODONE HYDROCHLORIDE 50 MG/1
25-100 TABLET, FILM COATED ORAL
COMMUNITY
Start: 2017-12-28

## 2020-07-27 RX ORDER — CLINDAMYCIN PHOSPHATE 10 MG/G
GEL TOPICAL
COMMUNITY
Start: 2016-10-03

## 2020-07-27 RX ORDER — ASPIRIN 325 MG
325 TABLET, DELAYED RELEASE (ENTERIC COATED) ORAL
COMMUNITY
Start: 2017-07-28

## 2020-07-27 RX ORDER — HYDROCHLOROTHIAZIDE 25 MG/1
37.5 TABLET ORAL
COMMUNITY
Start: 2017-12-28

## 2020-07-27 RX ORDER — LANOLIN ALCOHOL/MO/W.PET/CERES
100 CREAM (GRAM) TOPICAL
COMMUNITY

## 2020-07-28 ENCOUNTER — HOSPITAL ENCOUNTER (OUTPATIENT)
Dept: GENERAL RADIOLOGY | Facility: OTHER | Age: 70
End: 2020-07-28
Attending: ORTHOPAEDIC SURGERY
Payer: COMMERCIAL

## 2020-07-28 ENCOUNTER — OFFICE VISIT (OUTPATIENT)
Dept: ORTHOPEDICS | Facility: OTHER | Age: 70
End: 2020-07-28
Attending: ORTHOPAEDIC SURGERY
Payer: COMMERCIAL

## 2020-07-28 VITALS — DIASTOLIC BLOOD PRESSURE: 100 MMHG | HEART RATE: 84 BPM | SYSTOLIC BLOOD PRESSURE: 134 MMHG

## 2020-07-28 DIAGNOSIS — M25.559 HIP PAIN: Primary | ICD-10-CM

## 2020-07-28 DIAGNOSIS — M25.559 HIP PAIN: ICD-10-CM

## 2020-07-28 PROCEDURE — 99202 OFFICE O/P NEW SF 15 MIN: CPT | Performed by: ORTHOPAEDIC SURGERY

## 2020-07-28 PROCEDURE — G0463 HOSPITAL OUTPT CLINIC VISIT: HCPCS

## 2020-07-28 PROCEDURE — 73502 X-RAY EXAM HIP UNI 2-3 VIEWS: CPT

## 2020-07-28 NOTE — PROGRESS NOTES
Patient is here for consult on her right hip pain.   Nallely Montero LPN .....................7/28/2020 1:50 PM

## 2020-07-29 NOTE — PROGRESS NOTES
Visit Date:   07/28/2020      REASON FOR EVALUATION:  Right hip pain.      INTERVAL HISTORY:  Blessing comes in with multiyear history of right hip pain.  She struggles with that as well as obesity.  Has gotten multiple pins on the hip itself.  Has known right hip arthrosis of a significant nature.  She also has back trouble in addition to that.  She has seen different surgeons who have not been willing to operate on her secondary to her obesity.  She has worked hard to try to manage through that process, but has difficulty maintaining proper reduction  at this point.  She reports pain with activity, worse with that, a little bit better with rest.  Cortisone has never worked for her in the past.  She reports that she does have potentially some allergies in regards to that.  She is not a known diabetic.  Her other health status is in reasonable shape.  Otherwise she is pre-diabetic according to her.  Has a little bit of essential hypertension.  She is here to look at her options moving forward as it pertains to her hip.  She is using a walker for ambulation.      MEDICATIONS:  Reviewed.      ALLERGIES:  REVIEWED AS WELL.      REVIEW OF SYSTEMS:  A 12-point otherwise negative with the exception as stated above.      PHYSICAL EXAMINATION:  The patient's BMI is currently at  48.  She is alert, cooperative with my exam.  She uses a walker for ambulation.  Affect appropriate.  Examination of the right hip shows hip flexed to about 85-90, internal rotation of 0, external rotation about 15, abduction 30.  Pain with hip flexion and internal rotation.  No skin lesions seen.  Dorsalis pedis pulse 2+ here in addition to that.  The patient does walk with significant gait antalgia as well.      IMAGING:  X-rays have been reviewed.  Do show evidence for significant right hip arthrosis with severe osteoporosis.      IMPRESSION:  Right hip arthrosis, severe, with underlying morbid obesity.      PLAN:  At this time, I advised the patient  does get set for our optimization program.  Given some of her cardiac history, she would be best served by having the surgery done at [facility], but we need to optimize her and get her BMI less than 40.  We will also look at other optimizable factors moving forward as well in addition to that.  I will have Jacque work her through our program.         RUBEN SWENSON MD             D: 2020   T: 2020   MT:       Name:     CHELO CLEMENT   MRN:      [id number]        Account:      [id number]   :      1950           Visit Date:   2020      Document: [id number]

## 2020-09-24 ENCOUNTER — OFFICE VISIT (OUTPATIENT)
Dept: OPHTHALMOLOGY | Facility: CLINIC | Age: 70
End: 2020-09-24
Attending: OPHTHALMOLOGY
Payer: COMMERCIAL

## 2020-09-24 DIAGNOSIS — H53.40 VISUAL FIELD DEFECT: Primary | ICD-10-CM

## 2020-09-24 DIAGNOSIS — H53.2 DIPLOPIA: Primary | ICD-10-CM

## 2020-09-24 DIAGNOSIS — H53.149 PHOTOPHOBIA: ICD-10-CM

## 2020-09-24 DIAGNOSIS — G43.109 MIGRAINE AURA WITHOUT HEADACHE: ICD-10-CM

## 2020-09-24 DIAGNOSIS — H53.40 VISUAL FIELD DEFECT: ICD-10-CM

## 2020-09-24 DIAGNOSIS — H53.10 SUBJECTIVE VISION DISTURBANCE: ICD-10-CM

## 2020-09-24 PROCEDURE — G0463 HOSPITAL OUTPT CLINIC VISIT: HCPCS | Mod: 25,ZF

## 2020-09-24 PROCEDURE — 92081 LIMITED VISUAL FIELD XM: CPT | Mod: ZF | Performed by: OPHTHALMOLOGY

## 2020-09-24 ASSESSMENT — CONF VISUAL FIELD
OS_INFERIOR_TEMPORAL_RESTRICTION: 1
OS_SUPERIOR_TEMPORAL_RESTRICTION: 1
OD_INFERIOR_NASAL_RESTRICTION: 1
OD_SUPERIOR_NASAL_RESTRICTION: 1

## 2020-09-24 ASSESSMENT — VISUAL ACUITY
METHOD: SNELLEN - LINEAR
OD_SC: 20/25
OD_SC+: -2
OS_SC: 20/20

## 2020-09-24 ASSESSMENT — SLIT LAMP EXAM - LIDS
COMMENTS: NORMAL
COMMENTS: NORMAL

## 2020-09-24 ASSESSMENT — EXTERNAL EXAM - LEFT EYE: OS_EXAM: NORMAL

## 2020-09-24 ASSESSMENT — TONOMETRY
OD_IOP_MMHG: 10
IOP_METHOD: ICARE
OS_IOP_MMHG: 11

## 2020-09-24 ASSESSMENT — CUP TO DISC RATIO
OD_RATIO: 0.15
OS_RATIO: 0.1

## 2020-09-24 ASSESSMENT — EXTERNAL EXAM - RIGHT EYE: OD_EXAM: NORMAL

## 2020-09-24 NOTE — Clinical Note
9/24/2020       RE: Blessing Floyd  [address] [address] [address]     Dear Colleague,    Thank you for referring your patient, Blessing Floyd, to the EYE CLINIC at [facility]. Please see a copy of my visit note below.         Assessment & Plan     Blessing Floyd is a 70 year old female with the following diagnoses:   1. Diplopia    2. Visual field defect    3. Subjective vision disturbance    4. Photophobia    5. Migraine aura without headache         Patient was sent for consultation by Dr. Juan Navarro MD for multiple vision complaints.      70 year old woman presented with intermittent vertical monocular diplopia in both eyes that started 6 years ago that progroessively getting worse then it resolves after decreasing the dose of metoprolol .     She reports bright light as if it is the sunrise that last five minutes. She also has yellow and red swatches that last for 1 hours. She has severe headaches mainly in the right side of the head and face, she  Describes them as stabbing and spider web sensation after taking isosorbide mononitrate  (she thinks it is associated with dehydration). She denies any nausea, photophobia and phonophobia. She has photosensitivity during day light so she keeps wearing sunglasses even in the shades or else she feels that there is pressure sensation in her eyes. She had 3 episodes of complete vision loss in both eyes that lasted for 15 minutes and resolved on its own (last episode was in July 2020)  She had left homonymous  hemianopia that happened twice ,the first episode happened 8 years  and the second episode happened 3 years ago. Her left peripheral vision got better with occupational therapy in 2017.     She also mentions that sometimes she has trouble processing what she is seeing, it started happening after the stroke     MR BRAIN W/O CONTRAST 6/5/2018  IMPRESSION: Expected evolution of a right occipital infarct.    MR HEAD  BRAIN WWO  IMPRESSION:7/2017  Moderate acute to subacute right PCA infarct involving a large portion of the medial right occipital lobe. Punctate restricted diffusion in the right cerebellar white matter, likely reflecting a simultaneous infarct. Consider MRA/CTA for   further assessment    PMH: heart attack July 2019 ,  She used migraines with aura in her twenties, HTN. Disc disease, PCOS, sleep apnea, urinary incontinence     Her visual acuity is 20/25 in the right eye and 20/20 in the left with no APD. Her IOP is 10 in the right eye and 11 in the left eye. She has a small exophoria at far that gets worse at near. Visual field revealed left homonymous hemianopia.    My impression is that she has has multiple pathologies  1. Migraines with aura that is causing the visual symptoms without headaches such as the positive visual phenomena and transient bilateral  Vision loss.    2. 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 for the delayed processing of information, she could get neuropsych testing.  Will let her primary care physician or neurologist order this.    4.  The double vision is resolved  5.  Her photophobia is mild and she does not want to pursue tinted lenses.     Follow up with me as needed for worsening symptoms.               Attending Physician Attestation:  Complete documentation of historical and exam elements from today's encounter can be found in the full encounter summary report (not reduplicated in this progress note).  I personally obtained the chief complaint(s) and history of present illness.  I confirmed and edited as necessary the review of systems, past medical/surgical history, family history, social history, and examination findings as documented by others; and I examined the patient myself.  I personally reviewed the relevant tests, images, and reports as documented above.  I formulated and edited as necessary the assessment and plan and discussed the findings and  management plan with the patient and family. I personally reviewed the ophthalmic test(s) associated with this encounter, agree with the interpretation(s) as documented by the resident/fellow, and have edited the corresponding report(s) as necessary.  - Abdoulaye Gomes MD  Neuro-ophthalmology fellow  [facility]          Again, thank you for allowing me to participate in the care of your patient.      Sincerely,    Abdoulaye Mcgill MD

## 2020-09-24 NOTE — LETTER
2020          RE:  :  MRN: Blessing Floyd  1950  [id number]     Dear Dr. Navarro,    Thank you for asking me to see your very pleasant patient, Blessing Floyd, in neuro-ophthalmic consultation.  I would like to thank you for sending your records and I have summarized them in the history of present illness.  My assessment and plan are below.  For further details, please see my attached clinic note.      Assessment & Plan     Blessing Floyd is a 70 year old female with the following diagnoses:   1. Diplopia    2. Visual field defect    3. Subjective vision disturbance    4. Photophobia    5. Migraine aura without headache         Patient was sent for consultation by Dr. Juan Navarro for multiple vision complaints.      70 year old woman presented with intermittent vertical monocular diplopia in both eyes that started 6 years ago that progroessively getting worse then it resolves after decreasing the dose of metoprolol .     She reports bright light as if it is the sunrise that last five minutes. She also has yellow and red swatches that last for 1 hours. She has severe headaches mainly in the right side of the head and face, she  Describes them as stabbing and spider web sensation after taking isosorbide mononitrate  (she thinks it is associated with dehydration). She denies any nausea, photophobia and phonophobia. She has photosensitivity during day light so she keeps wearing sunglasses even in the shades or else she feels that there is pressure sensation in her eyes. She had 3 episodes of complete vision loss in both eyes that lasted for 15 minutes and resolved on its own (last episode was in 2020)  She had left homonymous  hemianopia that happened twice ,the first episode happened 8 years  and the second episode happened 3 years ago. Her left peripheral vision got better with occupational therapy in 2017.     She also mentions that sometimes she has trouble processing what she is seeing, it  started happening after the stroke     MR BRAIN W/O CONTRAST 6/5/2018  IMPRESSION: Expected evolution of a right occipital infarct.    MR HEAD BRAIN WWO  IMPRESSION:7/2017  Moderate acute to subacute right PCA infarct involving a large portion of the medial right occipital lobe. Punctate restricted diffusion in the right cerebellar white matter, likely reflecting a simultaneous infarct. Consider MRA/CTA for   further assessment    PMH: heart attack July 2019 ,  She used migraines with aura in her twenties, HTN. Disc disease, PCOS, sleep apnea, urinary incontinence     Her visual acuity is 20/25 in the right eye and 20/20 in the left with no APD. Her IOP is 10 in the right eye and 11 in the left eye. She has a small exophoria at far that gets worse at near. Visual field revealed left homonymous hemianopia.    My impression is that she has has multiple pathologies  1. Migraines with aura that is causing the visual symptoms without headaches such as the positive visual phenomena and transient bilateral  Vision loss.    2. 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 for the delayed processing of information, she could get neuropsych testing.  Will let her primary care physician or neurologist order this.    4.  The double vision is resolved  5.  Her photophobia is mild and she does not want to pursue tinted lenses.     Follow up with me as needed for worsening symptoms.      Again, thank you for allowing me to participate in the care of your patient.      Sincerely,    Abdoulaye Mcgill MD  Professor  Ophthalmology Residency   Director of Neuro-Ophthalmology  [employer] [facility] [employer] Endowed Chair  Departments of Ophthalmology, Neurology, and Neurosurgery  [facility] [address] [place] [address]  [id number]  T - [phone]   - [phone]  [email or web address]      CC: Juan Navarro MD, MD  VIA Facsimile: [phone]     Jocelyn Ordonez MD  [facility]  [place] [id number] [place] [id number]  VIA Facsimile: [phone]

## 2020-09-24 NOTE — PROGRESS NOTES
Assessment & Plan     Blessing Floyd is a 70 year old female with the following diagnoses:   1. Diplopia    2. Visual field defect    3. Subjective vision disturbance    4. Photophobia    5. Migraine aura without headache         Patient was sent for consultation by Dr. Juan Navarro MD for multiple vision complaints.      70 year old woman presented with intermittent vertical monocular diplopia in both eyes that started 6 years ago that progroessively getting worse then it resolves after decreasing the dose of metoprolol .     She reports bright light as if it is the sunrise that last five minutes. She also has yellow and red swatches that last for 1 hours. She has severe headaches mainly in the right side of the head and face, she  Describes them as stabbing and spider web sensation after taking isosorbide mononitrate  (she thinks it is associated with dehydration). She denies any nausea, photophobia and phonophobia. She has photosensitivity during day light so she keeps wearing sunglasses even in the shades or else she feels that there is pressure sensation in her eyes. She had 3 episodes of complete vision loss in both eyes that lasted for 15 minutes and resolved on its own (last episode was in July 2020)  She had left homonymous  hemianopia that happened twice ,the first episode happened 8 years  and the second episode happened 3 years ago. Her left peripheral vision got better with occupational therapy in 2017.     She also mentions that sometimes she has trouble processing what she is seeing, it started happening after the stroke     MR BRAIN W/O CONTRAST 6/5/2018  IMPRESSION: Expected evolution of a right occipital infarct.    MR HEAD BRAIN [facility]  IMPRESSION:7/2017  Moderate acute to subacute right PCA infarct involving a large portion of the medial right occipital lobe. Punctate restricted diffusion in the right cerebellar white matter, likely reflecting a simultaneous infarct. Consider MRA/CTA for    further assessment    PMH: heart attack July 2019 ,  She used migraines with aura in her twenties, HTN. Disc disease, PCOS, sleep apnea, urinary incontinence     Her visual acuity is 20/25 in the right eye and 20/20 in the left with no APD. Her IOP is 10 in the right eye and 11 in the left eye. She has a small exophoria at far that gets worse at near. Visual field revealed left homonymous hemianopia.    My impression is that she has has multiple pathologies  1. Migraines with aura that is causing the visual symptoms without headaches such as the positive visual phenomena and transient bilateral  Vision loss.    2. 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 for the delayed processing of information, she could get neuropsych testing.  Will let her primary care physician or neurologist order this.    4.  The double vision is resolved  5.  Her photophobia is mild and she does not want to pursue tinted lenses.     Follow up with me as needed for worsening symptoms.               Attending Physician Attestation:  Complete documentation of historical and exam elements from today's encounter can be found in the full encounter summary report (not reduplicated in this progress note).  I personally obtained the chief complaint(s) and history of present illness.  I confirmed and edited as necessary the review of systems, past medical/surgical history, family history, social history, and examination findings as documented by others; and I examined the patient myself.  I personally reviewed the relevant tests, images, and reports as documented above.  I formulated and edited as necessary the assessment and plan and discussed the findings and management plan with the patient and family. I personally reviewed the ophthalmic test(s) associated with this encounter, agree with the interpretation(s) as documented by the resident/fellow, and have edited the corresponding report(s) as necessary.  - Abdoulaye Mcgill  MD Laila Gomes MD  Neuro-ophthalmology fellow  [facility]

## 2020-09-24 NOTE — NURSING NOTE
Chief Complaints and History of Present Illnesses   Patient presents with     Blurred Vision Follow-Up     Chief Complaint(s) and History of Present Illness(es)     Blurred Vision Follow-Up               Comments     Blessing Floyd is a 70 year old female who presents today for    1. Visual field defect  July 2017 secondary to right PCA occlusion. Improved since onset.  Since last winter, when she goes out in the sun, the eyes are strained, with pain and vision shuts down. Can see objects, but doesn't translate well.  Has to stop at every can of soup to recognize what it is. Otherwise, she comes home with the wrong product from the grocery store. Vision has tints of yellow and sometimes red.     Khalif DUVALL 12:19 PM September 24, 2020

## 2021-11-19 ENCOUNTER — HOSPITAL ENCOUNTER (OUTPATIENT)
Dept: MRI IMAGING | Facility: OTHER | Age: 71
Discharge: HOME | End: 2021-11-19
Attending: INTERNAL MEDICINE | Admitting: INTERNAL MEDICINE
Payer: COMMERCIAL

## 2021-11-19 DIAGNOSIS — G45.9 TIA (TRANSIENT ISCHEMIC ATTACK): ICD-10-CM

## 2021-11-19 PROCEDURE — 70551 MRI BRAIN STEM W/O DYE: CPT

## 2022-07-26 ENCOUNTER — HOSPITAL ENCOUNTER (OUTPATIENT)
Dept: MAMMOGRAPHY | Facility: OTHER | Age: 72
Discharge: HOME | End: 2022-07-26
Attending: NURSE PRACTITIONER
Payer: COMMERCIAL

## 2022-07-26 ENCOUNTER — HOSPITAL ENCOUNTER (OUTPATIENT)
Dept: ULTRASOUND IMAGING | Facility: OTHER | Age: 72
Discharge: HOME | End: 2022-07-26
Attending: NURSE PRACTITIONER
Payer: COMMERCIAL

## 2022-07-26 DIAGNOSIS — N63.22 MASS OF UPPER INNER QUADRANT OF LEFT BREAST: ICD-10-CM

## 2022-07-26 PROCEDURE — 77066 DX MAMMO INCL CAD BI: CPT

## 2022-07-26 PROCEDURE — 76642 ULTRASOUND BREAST LIMITED: CPT | Mod: LT

## 2022-08-03 ENCOUNTER — MEDICAL CORRESPONDENCE (OUTPATIENT)
Dept: HEALTH INFORMATION MANAGEMENT | Facility: OTHER | Age: 72
End: 2022-08-03

## 2022-08-11 ENCOUNTER — HOSPITAL ENCOUNTER (OUTPATIENT)
Dept: MRI IMAGING | Facility: OTHER | Age: 72
Discharge: HOME | End: 2022-08-11
Attending: FAMILY MEDICINE | Admitting: FAMILY MEDICINE
Payer: COMMERCIAL

## 2022-08-11 DIAGNOSIS — M54.16 LUMBAR RADICULOPATHY: ICD-10-CM

## 2022-08-11 DIAGNOSIS — M48.00 SPINAL STENOSIS: ICD-10-CM

## 2022-08-11 PROCEDURE — 72148 MRI LUMBAR SPINE W/O DYE: CPT

## 2023-07-13 ENCOUNTER — HOSPITAL ENCOUNTER (OUTPATIENT)
Dept: ULTRASOUND IMAGING | Facility: OTHER | Age: 73
Discharge: HOME | End: 2023-07-13
Attending: FAMILY MEDICINE
Payer: COMMERCIAL

## 2023-07-13 ENCOUNTER — HOSPITAL ENCOUNTER (OUTPATIENT)
Dept: MAMMOGRAPHY | Facility: OTHER | Age: 73
Discharge: HOME | End: 2023-07-13
Attending: FAMILY MEDICINE
Payer: COMMERCIAL

## 2023-07-13 DIAGNOSIS — N63.22 BREAST LUMP ON LEFT SIDE AT 11 O'CLOCK POSITION: ICD-10-CM

## 2023-07-13 DIAGNOSIS — N63.42 UNSPECIFIED LUMP IN LEFT BREAST, SUBAREOLAR: ICD-10-CM

## 2023-07-13 DIAGNOSIS — N63.25 BREAST LUMP ON LEFT SIDE AT 9 O'CLOCK POSITION: ICD-10-CM

## 2023-07-13 PROCEDURE — 77066 DX MAMMO INCL CAD BI: CPT

## 2023-07-13 PROCEDURE — 76642 ULTRASOUND BREAST LIMITED: CPT | Mod: LT

## 2025-03-11 ENCOUNTER — HOSPITAL ENCOUNTER (OUTPATIENT)
Dept: MRI IMAGING | Facility: OTHER | Age: 75
Discharge: HOME | End: 2025-03-11
Admitting: FAMILY MEDICINE
Payer: COMMERCIAL

## 2025-03-11 DIAGNOSIS — M54.16 LUMBAR RADICULOPATHY: ICD-10-CM

## 2025-03-11 PROCEDURE — 72148 MRI LUMBAR SPINE W/O DYE: CPT
